# Patient Record
Sex: FEMALE | Race: WHITE | NOT HISPANIC OR LATINO | Employment: FULL TIME | URBAN - METROPOLITAN AREA
[De-identification: names, ages, dates, MRNs, and addresses within clinical notes are randomized per-mention and may not be internally consistent; named-entity substitution may affect disease eponyms.]

---

## 2017-01-24 ENCOUNTER — HOSPITAL ENCOUNTER (OUTPATIENT)
Facility: MEDICAL CENTER | Age: 52
End: 2017-01-24
Attending: INTERNAL MEDICINE
Payer: COMMERCIAL

## 2017-01-24 LAB
ALBUMIN SERPL BCP-MCNC: 4.3 G/DL (ref 3.2–4.9)
ALBUMIN/GLOB SERPL: 1.3 G/DL
ALP SERPL-CCNC: 612 U/L (ref 30–99)
ALT SERPL-CCNC: 173 U/L (ref 2–50)
ANION GAP SERPL CALC-SCNC: 9 MMOL/L (ref 0–11.9)
AST SERPL-CCNC: 118 U/L (ref 12–45)
BILIRUB SERPL-MCNC: 0.9 MG/DL (ref 0.1–1.5)
BUN SERPL-MCNC: 11 MG/DL (ref 8–22)
CALCIUM SERPL-MCNC: 9.6 MG/DL (ref 8.5–10.5)
CEA SERPL-MCNC: 2.5 NG/ML (ref 0–3)
CHLORIDE SERPL-SCNC: 105 MMOL/L (ref 96–112)
CO2 SERPL-SCNC: 24 MMOL/L (ref 20–33)
CREAT SERPL-MCNC: 0.52 MG/DL (ref 0.5–1.4)
GLOBULIN SER CALC-MCNC: 3.2 G/DL (ref 1.9–3.5)
GLUCOSE SERPL-MCNC: 91 MG/DL (ref 65–99)
POTASSIUM SERPL-SCNC: 4.1 MMOL/L (ref 3.6–5.5)
PROT SERPL-MCNC: 7.5 G/DL (ref 6–8.2)
SODIUM SERPL-SCNC: 138 MMOL/L (ref 135–145)

## 2017-01-24 PROCEDURE — 82378 CARCINOEMBRYONIC ANTIGEN: CPT

## 2017-01-24 PROCEDURE — 80053 COMPREHEN METABOLIC PANEL: CPT

## 2017-02-21 ENCOUNTER — HOSPITAL ENCOUNTER (OUTPATIENT)
Dept: RADIOLOGY | Facility: MEDICAL CENTER | Age: 52
End: 2017-02-21
Attending: INTERNAL MEDICINE
Payer: COMMERCIAL

## 2017-02-21 DIAGNOSIS — C18.9 MALIGNANT NEOPLASM OF COLON, UNSPECIFIED SITE: ICD-10-CM

## 2017-02-21 PROCEDURE — 700117 HCHG RX CONTRAST REV CODE 255: Performed by: INTERNAL MEDICINE

## 2017-02-21 PROCEDURE — 71260 CT THORAX DX C+: CPT

## 2017-02-21 RX ADMIN — IOHEXOL 100 ML: 350 INJECTION, SOLUTION INTRAVENOUS at 08:57

## 2017-04-11 ENCOUNTER — HOSPITAL ENCOUNTER (OUTPATIENT)
Dept: RADIOLOGY | Facility: MEDICAL CENTER | Age: 52
End: 2017-04-11
Attending: INTERNAL MEDICINE
Payer: COMMERCIAL

## 2017-04-11 DIAGNOSIS — C18.9 MALIGNANT NEOPLASM OF COLON, UNSPECIFIED SITE: ICD-10-CM

## 2017-04-11 NOTE — CONSULTS
"Interventional Oncology Consultation      Re: Vivian Bajwa     MRN: [id number]   : 1965    Vivian Bajwa was referred by Enrique Tate MD. She is a 51 y.o. female seen in clinic for evaluation and possible intervention of unresectable metastatic colon cancer to both hepatic lobes.     History of Present Illness:  Ms.Schlaffer Bajwa enjoyed good health until 2016. She has a history of Gilbert's disease and reports her total bilirubin has been elevated as long as she can remember. She describes an elevation of AST/ ALT on her annual labs in 2016 and had planned to repeat them later in the year, however in October she presented to her local emergency room with severe abdominal pain and was found to have a small bowel obstruction due to tumor as well as innumerable hepatic lesions. She underwent surgical intervention that included resection and biopsy of hepatic lesions, which proved metastatic adenocarcinoma. She first underwent FOLFOX in November. She was on Avastin as well but developed high fevers after each of two infusions, therefore she is no longer on this medication. She was switched to FOLFIRI in January and has infusions every two weeks, with her last infusion on  through . She denies any problems with chemotherapy and in fact has gained weight, from 96 to 106 pounds. She complains of dry hands and mild sores in her mouth with the chemotherapy but no other complaints. She had side effects from the steroids that increased her anxiety and caused insomnia but those have improved with dose reduction of her steroid infusion. She had abdominal pain and \"stomach swelling and hardness\" at the time of her diagnosis that has resolved. She works as an  and lives in [place]. She has a port in place. She has a history of anxiety and panic attacks and expresses a great deal of anxiety about her diagnosis and prognosis.     She is " seen today for review of imaging studies and discussion of possible transarterial therapy with Y90 radioembolization.     Past Medical History   Diagnosis Date   • Palpitations    • PMDD (premenstrual dysphoric disorder)    • Gilbert's disease    • GERD (gastroesophageal reflux disease)    • Vocal cord nodules    • Closed head injury    • Panic disorder      takes Xanax as needed   • Skull fracture (CMS-HCC)    • Femur fracture (CMS-HCC)    • Cancer (CMS-HCC)    • Metastatic colon cancer to liver (CMS-HCC)      Past Surgical History   Procedure Laterality Date   • Exploratory laparotomy  10/27/2016     Procedure: EXPLORATORY LAPAROTOMY;  Surgeon: Jose Borrego M.D.;  Location: SURGERY [facility];  Service:    • Colon resection  10/27/2016     Procedure: RIGHT COLON RESECTION;  Surgeon: Jose Borrego M.D.;  Location: SURGERY [facility];  Service:    • Liver biopsy  10/27/2016     Procedure: LIVER BIOPSY;  Surgeon: Jose Borrego M.D.;  Location: SURGERY [facility];  Service:    • Cath placement Right 11/1/2016     Procedure: CATH PLACEMENT - PORT ;  Surgeon: Jose Borrego M.D.;  Location: SURGERY SAME DAY [facility];  Service:      Social History     Social History   • Marital Status:      Spouse Name: N/A   • Number of Children: N/A   • Years of Education: N/A     Occupational History   • Not on file.     Social History Main Topics   • Smoking status: Former Smoker -- 0.25 packs/day     Types: Cigarettes     Quit date: 10/26/2016   • Smokeless tobacco: Never Used   • Alcohol Use: No   • Drug Use: No   • Sexual Activity: Not on file     Other Topics Concern   • Not on file     Social History Narrative     Family History   Problem Relation Age of Onset   • Heart Disease Mother    • Cancer Father      esoph       Review of Systems:  Pertinent items are noted in HPI.    Labs:   [facility] March 21, 2017:  WBC 4.8  HGB 12.8  HCT 38.6  Platelet count 198,000  ANC  2.2    [facility]:   Ref. Range 3/18/2017 11:00 4/1/2017 08:55   Sodium Latest Ref Range: 136-145 mmol/L 140 142   Potassium Latest Ref Range: 3.5-5.1 mmol/L 4.1 3.8   Chloride Latest Ref Range:  mmol/L 105 106   Co2 Latest Ref Range: 20-29 mmol/L 27 26   Anion Gap Latest Ref Range: 10-18 mmol/L 12 14   Glucose Latest Ref Range:  mg/dL 79 83   Bun Latest Ref Range: 6-18 mg/dL 12 7   Creatinine Latest Ref Range: 0.6-1.0 mg/dL 0.8 0.7   GFR If  Latest Ref Range: >60 mL/min/1.73 m 2 >60 >60   GFR If Non  Latest Ref Range: >60 mL/min/1.73 m 2 >60 >60   Calcium Latest Ref Range: 8.5-10.1 mg/dL 9.8 9.5   AST(SGOT) Latest Ref Range: 5-37 U/L 83 (H) 71 (H)   ALT(SGPT) Latest Ref Range: 12-78 U/L 186 (H) 133 (H)   Alkaline Phosphatase Latest Ref Range:  U/L 347 (H) 308 (H)   Total Bilirubin Latest Ref Range: 0.1-1.2 mg/dL 1.5 (H) 1.5 (H)   Albumin Latest Ref Range: 3.5-5.0 g/dL 3.8 3.8   Total Protein Latest Ref Range: 6.4-8.3 g/dL 7.4 7.2   A-G Ratio Unknown 1.1 1.1   Carcinoembryonic Antigen Latest Units: ng/mL 2.1 2.0        Ref. Range 12/28/2016 12:15   PT Latest Ref Range: 12.0-14.6 sec 14.8 (H)   INR Latest Ref Range: 0.87-1.13  1.12   APTT Latest Ref Range: 24.7-36.0 sec 43.8 (H)     Pathology:  [facility], October 27, 2016:  FINAL DIAGNOSIS:    A. Right colon resection:         Right colon demonstrates an invasive moderate to poorly          differentiated adenocarcinoma, including focal mucinous          differentiation.         The invasive adenocarcinoma is positive for synaptophysin          predominantly within the poorly differentiated component and          focally within the surrounding glands, consistent with focal          neuroendocrine differentiation.         The invasive adenocarcinoma invades into the subserosal adipose          tissue.         The proximal, distal and mesenteric resection margins are          negative for malignancy.         There is  angiolymphatic space invasion identified.         Eight of twenty-nine lymph nodes (8/29) positive for metastatic          adenocarcinoma.         There are approximately eight soft tissue tumor deposits.          Several of the tumor deposits are suggestive for former lymph          nodes completely replaced by metastatic adenocarcinoma.         Benign appendix negative for acute appendicitis or malignancy.         Benign terminal ileum negative for malignancy.         See synoptic report and comment below.  B. Liver biopsy:         Liver demonstrates a metastatic poorly differentiated carcinoma,          morphologically consistent with metastatic poorly          differentiated colon adenocarcinoma to the liver.         The metastatic adenocarcinoma extends to the cauterized margin          of the biopsy piece.  ADDENDUM:    A request for NESTOR/ZAHIRA Panel (Extended KRAS) molecular analysis was sent  out on 01/19/2017 for patient Vivian Bajwa from Dr. Colt Tate.  The test was requested to be performed on tissue from case  [id number], (signed out on 10/31/2016).  The case report, slides, and  blocks for the cited accession were retrieved from archives.  Dr. Phan  reviewed the original pathology report, examined candidate H&E slides,  and selected the block A5 appropriate to the specifications of the  ordered molecular analysis.  The block was forwarded to [facility] where the subject molecular test was performed.  Below  lists the results of the molecular test[s] ordered.    Block A5 was sent to [facility] for NESTOR/ZAHIRA Panel; a  report was generated.  Result:  DETECTED ABNORMALITIES BRAF.  Please  see separate [facility] report for further details.  ADDENDUM:    This case is being addended to report the results of IHC studies for  mismatch repair proteins to evaluate for Galvin syndrome. All four  mismatch repair proteins are expressed in the tumor cells.  These  findings make Gavlin syndrome unlikely. These results are also reported  in the IHC section below.    Radiology:   Review of imaging obtained at [facility]:  CT chest abdomen pelvis February 21, 2017:  1.  Decrease in size of multiple metastatic lesions within liver consistent with treatment response.  2.  Heterogeneous low-attenuation left lobe of the liver decreased consistent with treatment response.  3.  Thrombosed portal veins within the left lobe liver are partially recanalized.  4.  Proximal colectomy and ileocolic anastomosis. No evidence of bowel obstruction. No free fluid.  5.  Mild bilateral pelvocaliectasis similar to previous findings. Ureters are not well delineated.  6.  No pulmonary consolidation, mass, or pleural effusion.    CT abdomen pelvis December 28, 2016:  1.  Hepatomegaly with numerous hepatic metastases, as seen previously.  2.  RIGHT mid abdominal bowel anastomosis.  3.  No bowel obstruction or evidence for perforation.  4.  Increased colonic stool.  5.  Fibroid uterus.    Current Outpatient Prescriptions   Medication Sig Dispense Refill   • alprazolam (XANAX) 0.25 MG Tab Take 1 Tab by mouth 3 times a day as needed for Sleep. 90 Tab 3   • ondansetron (ZOFRAN ODT) 4 MG TABLET DISPERSIBLE Take 4 mg by mouth every 8 hours as needed for Nausea/Vomiting.     • oxycodone-acetaminophen (PERCOCET) 5-325 MG Tab Take 1 Tab by mouth every bedtime.     • loperamide (IMODIUM) 2 MG Cap Take 2 mg by mouth 4 times a day as needed for Diarrhea.     • acetaminophen (TYLENOL) 325 MG Tab Take 650 mg by mouth every four hours as needed (headache).       No current facility-administered medications for this encounter.       Allergies   Allergen Reactions   • Pcn [Penicillins]      Unknown allergy,rxn = as a kid  Tolerates cephalosporins     • Lexapro Unspecified     Heart raced   • Paxil [Paroxetine] Unspecified     Heart raced   • Zoloft Unspecified     Heart raced         Physical Exam:  General Appearance:  healthy, alert, cooperative, anxious  Lungs: negative findings: normal respiratory rate and rhythm  Extremities: positive findings: palmar dryness  Ambulates greater than 100 feet independently with steady gait.  ECOG Performance Status 0    Impression:   1. Unresectable metastatic colon cancer to both hepatic lobes.  2. Gilbert's disease.  3. Gastroesophageal reflux disease.  4. Anxiety.  5. Panic disorder.    Plan:   Yovanny Lamb MD has reviewed Ms.Schlaffer Bajwa's history and imaging studies, examined the patient, and discussed treatment options. Ms.Schlaffer Bajwa is a candidate for palliative transarterial radioembolization of unresectable liver cancer to both hepatic lobes. We discussed the method of the procedure at length including angiography and embolization as well as the expected clinical course with the possibility of post-embolization syndrome nausea and pain and hospitalization. We explained that this procedure is palliative and not curative. We discussed the possibility of tumor recurrence and development of future tumors. We additionally discussed the risks, including bleeding and infection, damage to the arteries, reaction to any medications given during the procedure, potential side effects of contrast administration including renal damage, non-target embolization, radiation-induced ulcers or liver disease, liver failure, and death. We discussed alternatives of the procedure including surveillance with no intervention and continuing with chemotherapy alone. The patient verbalizes understanding of risks and alternatives and elects to proceed. All questions were answered. Written pre- and post- procedure care instructions were provided.     The plan is as follows, pending insurance authorization:  · Start proton pump inhibitor.   · Continue FOLFIRI, currently scheduled for 4/18, 5/2, and 5/16.  · Mapping April 24.   · Y90 SIRT Right lobe May 9.  · Follow up in clinic 10-14 days after  procedure.  · Plan Y90 SIRT left lobe end of June.  · Triple phase CT liver at 3 months.    FUNMILAYO Choi with Yovanny Lamb MD  Interventional Radiology   [address] ([id number])  [place] [address]  [phone]

## 2017-04-19 DIAGNOSIS — Z01.812 PRE-PROCEDURAL LABORATORY EXAMINATIONS: ICD-10-CM

## 2017-04-19 LAB
ALBUMIN SERPL BCP-MCNC: 4.2 G/DL (ref 3.2–4.9)
ALBUMIN SERPL BCP-MCNC: 4.2 G/DL (ref 3.2–4.9)
ALBUMIN/GLOB SERPL: 1.6 G/DL
ALP SERPL-CCNC: 234 U/L (ref 30–99)
ALP SERPL-CCNC: 234 U/L (ref 30–99)
ALT SERPL-CCNC: 117 U/L (ref 2–50)
ALT SERPL-CCNC: 118 U/L (ref 2–50)
ANION GAP SERPL CALC-SCNC: 7 MMOL/L (ref 0–11.9)
AST SERPL-CCNC: 89 U/L (ref 12–45)
AST SERPL-CCNC: 92 U/L (ref 12–45)
BASOPHILS # BLD AUTO: 0.7 % (ref 0–1.8)
BASOPHILS # BLD: 0.04 K/UL (ref 0–0.12)
BILIRUB CONJ SERPL-MCNC: 0.4 MG/DL (ref 0.1–0.5)
BILIRUB INDIRECT SERPL-MCNC: 2.8 MG/DL (ref 0–1)
BILIRUB SERPL-MCNC: 3.2 MG/DL (ref 0.1–1.5)
BILIRUB SERPL-MCNC: 3.2 MG/DL (ref 0.1–1.5)
BUN SERPL-MCNC: 11 MG/DL (ref 8–22)
CALCIUM SERPL-MCNC: 9.9 MG/DL (ref 8.5–10.5)
CHLORIDE SERPL-SCNC: 105 MMOL/L (ref 96–112)
CO2 SERPL-SCNC: 28 MMOL/L (ref 20–33)
CREAT SERPL-MCNC: 0.84 MG/DL (ref 0.5–1.4)
EOSINOPHIL # BLD AUTO: 0.07 K/UL (ref 0–0.51)
EOSINOPHIL NFR BLD: 1.2 % (ref 0–6.9)
ERYTHROCYTE [DISTWIDTH] IN BLOOD BY AUTOMATED COUNT: 58.1 FL (ref 35.9–50)
GFR SERPL CREATININE-BSD FRML MDRD: >60 ML/MIN/1.73 M 2
GLOBULIN SER CALC-MCNC: 2.7 G/DL (ref 1.9–3.5)
GLUCOSE SERPL-MCNC: 83 MG/DL (ref 65–99)
HCT VFR BLD AUTO: 38.4 % (ref 37–47)
HGB BLD-MCNC: 12.6 G/DL (ref 12–16)
IMM GRANULOCYTES # BLD AUTO: 0.01 K/UL (ref 0–0.11)
IMM GRANULOCYTES NFR BLD AUTO: 0.2 % (ref 0–0.9)
INR PPP: 0.94 (ref 0.87–1.13)
LYMPHOCYTES # BLD AUTO: 1.74 K/UL (ref 1–4.8)
LYMPHOCYTES NFR BLD: 30.9 % (ref 22–41)
MCH RBC QN AUTO: 34.1 PG (ref 27–33)
MCHC RBC AUTO-ENTMCNC: 32.8 G/DL (ref 33.6–35)
MCV RBC AUTO: 103.8 FL (ref 81.4–97.8)
MONOCYTES # BLD AUTO: 0.49 K/UL (ref 0–0.85)
MONOCYTES NFR BLD AUTO: 8.7 % (ref 0–13.4)
NEUTROPHILS # BLD AUTO: 3.28 K/UL (ref 2–7.15)
NEUTROPHILS NFR BLD: 58.3 % (ref 44–72)
NRBC # BLD AUTO: 0 K/UL
NRBC BLD AUTO-RTO: 0 /100 WBC
PLATELET # BLD AUTO: 202 K/UL (ref 164–446)
PMV BLD AUTO: 10.6 FL (ref 9–12.9)
POTASSIUM SERPL-SCNC: 3.6 MMOL/L (ref 3.6–5.5)
PROT SERPL-MCNC: 6.9 G/DL (ref 6–8.2)
PROT SERPL-MCNC: 6.9 G/DL (ref 6–8.2)
PROTHROMBIN TIME: 12.9 SEC (ref 12–14.6)
RBC # BLD AUTO: 3.7 M/UL (ref 4.2–5.4)
SODIUM SERPL-SCNC: 140 MMOL/L (ref 135–145)
WBC # BLD AUTO: 5.6 K/UL (ref 4.8–10.8)

## 2017-04-19 PROCEDURE — 36415 COLL VENOUS BLD VENIPUNCTURE: CPT

## 2017-04-19 PROCEDURE — 80076 HEPATIC FUNCTION PANEL: CPT

## 2017-04-19 PROCEDURE — 80053 COMPREHEN METABOLIC PANEL: CPT

## 2017-04-19 PROCEDURE — 85610 PROTHROMBIN TIME: CPT

## 2017-04-19 PROCEDURE — 85025 COMPLETE CBC W/AUTO DIFF WBC: CPT

## 2017-04-19 RX ORDER — IBUPROFEN 200 MG
200 TABLET ORAL EVERY 6 HOURS PRN
COMMUNITY
End: 2019-01-31

## 2017-04-20 ENCOUNTER — TELEPHONE (OUTPATIENT)
Dept: RADIOLOGY | Facility: MEDICAL CENTER | Age: 52
End: 2017-04-20

## 2017-04-20 NOTE — TELEPHONE ENCOUNTER
Pt contacted regarding chemistry results from 4/19. T bili now 3.2. D/w Dr. Lamb, will cx mapping scheduled 4/24. Pt is on FOLFIRI pump and was infusing during her blood draw yesterday. Pre chemo t bili was 1.1 on 4/15. Has appt with Dr. Tate today.   Labs efaxed to Dr. Tate's office for review. He has Dr. Lamb's contact info to discuss if needed to discuss the plan for Y90 SIRT and chemo. Will recheck t bili and direct/indirect after pt off chemo and keep her appt 5/09 for possible mapping if her labs return to baseline.

## 2017-05-09 ENCOUNTER — APPOINTMENT (OUTPATIENT)
Dept: RADIOLOGY | Facility: MEDICAL CENTER | Age: 52
End: 2017-05-09
Attending: RADIOLOGY
Payer: COMMERCIAL

## 2017-05-09 ENCOUNTER — HOSPITAL ENCOUNTER (OUTPATIENT)
Facility: MEDICAL CENTER | Age: 52
End: 2017-05-09
Attending: RADIOLOGY | Admitting: RADIOLOGY
Payer: COMMERCIAL

## 2017-05-09 VITALS
HEART RATE: 62 BPM | BODY MASS INDEX: 20.04 KG/M2 | OXYGEN SATURATION: 99 % | SYSTOLIC BLOOD PRESSURE: 128 MMHG | RESPIRATION RATE: 17 BRPM | HEIGHT: 63 IN | WEIGHT: 113.1 LBS | DIASTOLIC BLOOD PRESSURE: 71 MMHG | TEMPERATURE: 97.2 F

## 2017-05-09 DIAGNOSIS — C78.7 METASTATIC COLON CANCER TO LIVER (HCC): ICD-10-CM

## 2017-05-09 DIAGNOSIS — C18.9 METASTATIC COLON CANCER TO LIVER (HCC): ICD-10-CM

## 2017-05-09 PROCEDURE — 700111 HCHG RX REV CODE 636 W/ 250 OVERRIDE (IP): Performed by: RADIOLOGY

## 2017-05-09 PROCEDURE — 76937 US GUIDE VASCULAR ACCESS: CPT

## 2017-05-09 PROCEDURE — 36246 INS CATH ABD/L-EXT ART 2ND: CPT

## 2017-05-09 PROCEDURE — 37243 VASC EMBOLIZE/OCCLUDE ORGAN: CPT

## 2017-05-09 PROCEDURE — 700105 HCHG RX REV CODE 258: Performed by: RADIOLOGY

## 2017-05-09 PROCEDURE — 99153 MOD SED SAME PHYS/QHP EA: CPT

## 2017-05-09 PROCEDURE — A9540 TC99M MAA: HCPCS

## 2017-05-09 PROCEDURE — 700111 HCHG RX REV CODE 636 W/ 250 OVERRIDE (IP)

## 2017-05-09 RX ORDER — MIDAZOLAM HYDROCHLORIDE 1 MG/ML
INJECTION INTRAMUSCULAR; INTRAVENOUS
Status: COMPLETED
Start: 2017-05-09 | End: 2017-05-09

## 2017-05-09 RX ORDER — OXYCODONE HYDROCHLORIDE 5 MG/1
10 TABLET ORAL
Status: DISCONTINUED | OUTPATIENT
Start: 2017-05-09 | End: 2017-05-09 | Stop reason: HOSPADM

## 2017-05-09 RX ORDER — OXYCODONE HYDROCHLORIDE 5 MG/1
5 TABLET ORAL
Status: DISCONTINUED | OUTPATIENT
Start: 2017-05-09 | End: 2017-05-09 | Stop reason: HOSPADM

## 2017-05-09 RX ORDER — VERAPAMIL HYDROCHLORIDE 2.5 MG/ML
INJECTION, SOLUTION INTRAVENOUS
Status: COMPLETED
Start: 2017-05-09 | End: 2017-05-09

## 2017-05-09 RX ORDER — ONDANSETRON 2 MG/ML
4 INJECTION INTRAMUSCULAR; INTRAVENOUS EVERY 8 HOURS PRN
Status: DISCONTINUED | OUTPATIENT
Start: 2017-05-09 | End: 2017-05-09 | Stop reason: HOSPADM

## 2017-05-09 RX ORDER — MIDAZOLAM HYDROCHLORIDE 1 MG/ML
.5-2 INJECTION INTRAMUSCULAR; INTRAVENOUS PRN
Status: ACTIVE | OUTPATIENT
Start: 2017-05-09 | End: 2017-05-09

## 2017-05-09 RX ORDER — SODIUM CHLORIDE 9 MG/ML
INJECTION, SOLUTION INTRAVENOUS
Status: DISCONTINUED | OUTPATIENT
Start: 2017-05-09 | End: 2017-05-09 | Stop reason: HOSPADM

## 2017-05-09 RX ORDER — ONDANSETRON 2 MG/ML
INJECTION INTRAMUSCULAR; INTRAVENOUS
Status: COMPLETED
Start: 2017-05-09 | End: 2017-05-09

## 2017-05-09 RX ORDER — ONDANSETRON 2 MG/ML
4 INJECTION INTRAMUSCULAR; INTRAVENOUS PRN
Status: DISCONTINUED | OUTPATIENT
Start: 2017-05-09 | End: 2017-05-09

## 2017-05-09 RX ORDER — DEXAMETHASONE SODIUM PHOSPHATE 4 MG/ML
8 INJECTION, SOLUTION INTRA-ARTICULAR; INTRALESIONAL; INTRAMUSCULAR; INTRAVENOUS; SOFT TISSUE
Status: COMPLETED | OUTPATIENT
Start: 2017-05-09 | End: 2017-05-09

## 2017-05-09 RX ORDER — MORPHINE SULFATE 4 MG/ML
4 INJECTION, SOLUTION INTRAMUSCULAR; INTRAVENOUS
Status: DISCONTINUED | OUTPATIENT
Start: 2017-05-09 | End: 2017-05-09 | Stop reason: HOSPADM

## 2017-05-09 RX ORDER — SODIUM CHLORIDE 9 MG/ML
500 INJECTION, SOLUTION INTRAVENOUS
Status: ACTIVE | OUTPATIENT
Start: 2017-05-09 | End: 2017-05-09

## 2017-05-09 RX ORDER — HEPARIN SODIUM,PORCINE 1000/ML
VIAL (ML) INJECTION
Status: COMPLETED
Start: 2017-05-09 | End: 2017-05-09

## 2017-05-09 RX ADMIN — NITROGLYCERIN 100 MCG: 20 INJECTION INTRAVENOUS at 10:20

## 2017-05-09 RX ADMIN — MIDAZOLAM HYDROCHLORIDE 1 MG: 1 INJECTION INTRAMUSCULAR; INTRAVENOUS at 09:56

## 2017-05-09 RX ADMIN — MIDAZOLAM 1 MG: 1 INJECTION INTRAMUSCULAR; INTRAVENOUS at 10:55

## 2017-05-09 RX ADMIN — MIDAZOLAM HYDROCHLORIDE 1 MG: 1 INJECTION INTRAMUSCULAR; INTRAVENOUS at 10:47

## 2017-05-09 RX ADMIN — MIDAZOLAM HYDROCHLORIDE 1 MG: 1 INJECTION INTRAMUSCULAR; INTRAVENOUS at 11:02

## 2017-05-09 RX ADMIN — MIDAZOLAM HYDROCHLORIDE 1 MG: 1 INJECTION INTRAMUSCULAR; INTRAVENOUS at 10:45

## 2017-05-09 RX ADMIN — FENTANYL CITRATE 50 MCG: 50 INJECTION, SOLUTION INTRAMUSCULAR; INTRAVENOUS at 09:57

## 2017-05-09 RX ADMIN — VERAPAMIL HYDROCHLORIDE 2.5 MG: 2.5 INJECTION, SOLUTION INTRAVENOUS at 10:20

## 2017-05-09 RX ADMIN — MIDAZOLAM 1 MG: 1 INJECTION INTRAMUSCULAR; INTRAVENOUS at 09:56

## 2017-05-09 RX ADMIN — MIDAZOLAM 1 MG: 1 INJECTION INTRAMUSCULAR; INTRAVENOUS at 10:45

## 2017-05-09 RX ADMIN — DEXAMETHASONE SODIUM PHOSPHATE 8 MG: 4 INJECTION, SOLUTION INTRAMUSCULAR; INTRAVENOUS at 09:00

## 2017-05-09 RX ADMIN — MIDAZOLAM HYDROCHLORIDE 1 MG: 1 INJECTION INTRAMUSCULAR; INTRAVENOUS at 10:01

## 2017-05-09 RX ADMIN — MIDAZOLAM HYDROCHLORIDE 1 MG: 1 INJECTION INTRAMUSCULAR; INTRAVENOUS at 10:08

## 2017-05-09 RX ADMIN — ONDANSETRON HYDROCHLORIDE 16 MG: 2 SOLUTION INTRAMUSCULAR; INTRAVENOUS at 09:00

## 2017-05-09 RX ADMIN — HEPARIN SODIUM 3000 UNITS: 1000 INJECTION, SOLUTION INTRAVENOUS; SUBCUTANEOUS at 10:20

## 2017-05-09 RX ADMIN — SODIUM CHLORIDE: 9 INJECTION, SOLUTION INTRAVENOUS at 09:00

## 2017-05-09 RX ADMIN — MIDAZOLAM HYDROCHLORIDE 1 MG: 1 INJECTION INTRAMUSCULAR; INTRAVENOUS at 10:55

## 2017-05-09 RX ADMIN — MIDAZOLAM HYDROCHLORIDE 1 MG: 1 INJECTION INTRAMUSCULAR; INTRAVENOUS at 10:24

## 2017-05-09 ASSESSMENT — PAIN SCALES - GENERAL
PAINLEVEL_OUTOF10: 2
PAINLEVEL_OUTOF10: 0
PAINLEVEL_OUTOF10: 2

## 2017-05-09 NOTE — PROGRESS NOTES
"Y90 mapping performed by Dr Lamb via left radial access; intra-arterial \"cocktail\" administered (heparin 3,000 units, nitroglycerin 100 mcg, verapamil 2.5 mg) for sheath insertion. Patient tolerated procedure well and TR band applied to left radial cath insertion site with 13 ml inflation.  updated and accompanied patient to PPU.    [employer] VortX Meme 18 pushable coil 3 mm x 3.3 mm ref [id number] lot #[id number]  "

## 2017-05-09 NOTE — PROGRESS NOTES
Imaging in nuclear medicine complete and patient transported to PPU in good condition. Left radial access site CDI, cap refill brisk.

## 2017-05-09 NOTE — IP AVS SNAPSHOT
5/9/2017    [facility] [address] [id number]  [place] [address]    Dear Vivian:    [facility] wants to ensure your discharge home is safe and you or your loved ones have had all of your questions answered regarding your care after you leave the hospital.    Below is a list of resources and contact information should you have any questions regarding your hospital stay, follow-up instructions, or active medical symptoms.    Questions or Concerns Regarding… Contact   Medical Questions Related to Your Discharge  (7 days a week, 8am-5pm) Contact a Nurse Care Coordinator   [phone]   Medical Questions Not Related to Your Discharge  (24 hours a day / 7 days a week)  Contact the Nurse Health Line   [phone]    Medications or Discharge Instructions Refer to your discharge packet   or contact your [facility] Primary Care Provider   [phone]   Follow-up Appointment(s) Schedule your appointment via [employer]   or contact Scheduling [phone]   Billing Review your statement via [employer]  or contact Billing [phone]   Medical Records Review your records via [employer]   or contact Medical Records [phone]     You may receive a telephone call within two days of discharge. This call is to make certain you understand your discharge instructions and have the opportunity to have any questions answered. You can also easily access your medical information, test results and upcoming appointments via the [employer] free online health management tool. You can learn more and sign up at [employer]. For assistance setting up your [employer] account, please call [phone].    Once again, we want to ensure your discharge home is safe and that you have a clear understanding of any next steps in your care. If you have any questions or concerns, please do not hesitate to contact us, we are here for you. Thank you for choosing [facility] for your healthcare needs.    Sincerely,    Your [facility] Healthcare Team

## 2017-05-09 NOTE — OR NURSING
1156 patient arrived from IR s/p Y90 mapping left radial access hemoband clean, vss, denies pain, s.o.b, n/v, n/t  1210 3 cc removed from hemoband site clean  1225 3 cc removed from lt radial site clean  1240 3 cc removed from lt radial site clean  1255 4 cc removed from lt radial gauze tegarderm applied, patient requesting pressure dressing to be applied on top, pressure dressing applied no bleeding  1335 discharge instructions given to patient, patient verbalize understanding of the orders, prior to dc vss, port de accessed no bleeding, left radial clean, pt left via walking with  and all her personal belongings.

## 2017-05-09 NOTE — IP AVS SNAPSHOT
" Home Care Instructions                                                                                                                Name:Vivian Bajwa  Medical Record Number:[id number]  CSN: [id number]    YOB: 1965   Age: 51 y.o.  Sex: female  HT:1.6 m (5' 2.99\") WT: 51.3 kg (113 lb 1.5 oz)          Admit Date: 5/9/2017     Discharge Date:   Today's Date: 5/9/2017  Attending Doctor:  Yovanny Lamb M.D.                  Allergies:  Pcn; Lexapro; Paxil; and Zoloft                Discharge Instructions         ACTIVITY: Rest and take it easy for the first 24 hours.  A responsible adult is recommended to remain with you during that time.  It is normal to feel sleepy.  We encourage you to not do anything that requires balance, judgment or coordination.    MILD FLU-LIKE SYMPTOMS ARE NORMAL. YOU MAY EXPERIENCE GENERALIZED MUSCLE ACHES, THROAT IRRITATION, HEADACHE AND/OR SOME NAUSEA.    FOR 24 HOURS DO NOT:  Drive, operate machinery or run household appliances.  Drink beer or alcoholic beverages.   Make important decisions or sign legal documents.    SPECIAL INSTRUCTIONS: follow up with Dr lamb call with any questions [phone]   If you experience chest pain s.o.b call 911 return to Er     DIET: To avoid nausea, slowly advance diet as tolerated, avoiding spicy or greasy foods for the first day.  Add more substantial food to your diet according to your physician's instructions.  Babies can be fed formula or breast milk as soon as they are hungry.  INCREASE FLUIDS AND FIBER TO AVOID CONSTIPATION.    SURGICAL DRESSING/BATHING: keep dressing clean and dry for 24 hours, you may remove dressing after 24 hours    FOLLOW-UP APPOINTMENT:  A follow-up appointment should be arranged with your doctor in [phone]; call to schedule.    You should CALL YOUR PHYSICIAN if you develop:  Fever greater than 101 degrees F.  Pain not relieved by medication, or persistent nausea or vomiting.  Excessive bleeding (blood " soaking through dressing) or unexpected drainage from the wound.  Extreme redness or swelling around the incision site, drainage of pus or foul smelling drainage.  Inability to urinate or empty your bladder within 8 hours.  Problems with breathing or chest pain.    You should call 911 if you develop problems with breathing or chest pain.  If you are unable to contact your doctor or surgical center, you should go to the nearest emergency room or urgent care center.  Physician's telephone #: [phone]    If any questions arise, call your doctor.  If your doctor is not available, please feel free to call the Surgical Center at [phone].  The Center is open Monday through Friday from 7AM to 7PM.  You can also call the [facility] HOTLINE open 24 hours/day, 7 days/week and speak to a nurse at [phone], or toll free at [phone].    A registered nurse may call you a few days after your surgery to see how you are doing after your procedure.    MEDICATIONS: Resume taking daily medication.  Take prescribed pain medication with food.  If no medication is prescribed, you may take non-aspirin pain medication if needed.  PAIN MEDICATION CAN BE VERY CONSTIPATING.  Take a stool softener or laxative such as senokot, pericolace, or milk of magnesia if needed    If your physician has prescribed pain medication that includes Acetaminophen (Tylenol), do not take additional Acetaminophen (Tylenol) while taking the prescribed medication.    Depression / Suicide Risk    As you are discharged from this [facility] facility, it is important to learn how to keep safe from harming yourself.    Recognize the warning signs:  · Abrupt changes in personality, positive or negative- including increase in energy   · Giving away possessions  · Change in eating patterns- significant weight changes-  positive or negative  · Change in sleeping patterns- unable to sleep or sleeping all the time   · Unwillingness or inability to  communicate  · Depression  · Unusual sadness, discouragement and loneliness  · Talk of wanting to die  · Neglect of personal appearance   · Rebelliousness- reckless behavior  · Withdrawal from people/activities they love  · Confusion- inability to concentrate     If you or a loved one observes any of these behaviors or has concerns about self-harm, here's what you can do:  · Talk about it- your feelings and reasons for harming yourself  · Remove any means that you might use to hurt yourself (examples: pills, rope, extension cords, firearm)  · Get professional help from the community (Mental Health, Substance Abuse, psychological counseling)  · Do not be alone:Call your Safe Contact- someone whom you trust who will be there for you.  · Call your local CRISIS HOTLINE 529-3436 or 642-244-2771  · Call your local Children's Mobile Crisis Response Team [place] (533) 884-2638 or www.Ar[employer]CAD  · Call the toll free National Suicide Prevention Hotlines   · National Suicide Prevention Lifeline 705-368-WFMZ (7799)  [place] [facility] Line Network 800-SUICIDE (359-4830)  Radial Catherization Discharge Instructions      · Do not subject hand/arm to any forceful movements for 24 hours    i.e. supporting weight when rising from the chair or bed.   · Do not drive a car for 24 hours  · You may remove the dressing tomorrow  · You may shower on the day following your procedure.  Do not take a tub bath or submerge the puncture site in water for 3 days following the procedure.  · No Lifting more than 3-5 pounds with affected wrist for 5 days  · Follow up with Dr. Lamb2-4 weeks.  · Increase fluids for 2 days post procedure.  · Continue all previous medications unless otherwise instructed.    If bleeding should occur following discharge:  · Sit down and apply firm pressure to site with your fingers for 10 minutes  · If the bleeding stops, continue to sit quietly, keeping your wrist straight for 2 hours.  Notify physician as soon  as possible ( [phone])  · If bleeding does not stop after 10 minutes, or if there is a large amount of bleeding or spurting, call 911 immediately.  Do not drive yourself to the hospital.       Medication List      CHANGE how you take these medications        Instructions    Morning Afternoon Evening Bedtime    doxycycline 100 MG Tabs   What changed:  additional instructions   Commonly known as:  VIBRAMYCIN        Take 1 Tab by mouth 2 times a day.   Dose:  100 mg                          CONTINUE taking these medications        Instructions    Morning Afternoon Evening Bedtime    alprazolam 0.25 MG Tabs   Commonly known as:  XANAX        Take 1 Tab by mouth 3 times a day as needed for Sleep.   Dose:  0.25 mg                        ibuprofen 200 MG Tabs   Commonly known as:  MOTRIN        Take 200 mg by mouth every 6 hours as needed.   Dose:  200 mg                        NON SPECIFIED        by Intravenous route every 14 days. Folfiri chemotherapy                        ondansetron 4 MG Tbdp   Commonly known as:  ZOFRAN ODT        Take 4 mg by mouth every 8 hours as needed for Nausea/Vomiting.   Dose:  4 mg                        oxycodone-acetaminophen 5-325 MG Tabs   Commonly known as:  PERCOCET        Take 1 Tab by mouth every bedtime.   Dose:  1 Tab                                Medication Information     Above and/or attached are the medications your physician expects you to take upon discharge. Review all of your home medications and newly ordered medications with your doctor and/or pharmacist. Follow medication instructions as directed by your doctor and/or pharmacist. Please keep your medication list with you and share with your physician. Update the information when medications are discontinued, doses are changed, or new medications (including over-the-counter products) are added; and carry medication information at all times in the event of emergency situations.        Resources     Quit Smoking /  Tobacco Use:    I understand the use of any tobacco products increases my chance of suffering from future heart disease or stroke and could cause other illnesses which may shorten my life. Quitting the use of tobacco products is the single most important thing I can do to improve my health. For further information on smoking / tobacco cessation call a Toll Free Quit Line at 1-433.402.7793 (*[employer] [place]) or 1-247.917.7876 (American Lung Association) or you can access the web based program at www.lungusa.org.    [place] Tobacco Users Help Line:  (521) 409-8963       Toll Free: 1-133.108.6526  Quit Tobacco Program [facility] (457)535-2824    Crisis Hotline:    [place] Crisis Hotline:  8-595-RLGPSOD or 1-685.153.3052    [place] Crisis Hotline:    1-355.591.4839 or 173-575-4721    Discharge Survey:   Thank you for choosing [facility]. We hope we did everything we could to make your hospital stay a pleasant one. You may be receiving a survey and we would appreciate your time and participation in answering the questions. Your input is very valuable to us in our efforts to improve our service to our patients and their families.            Signatures     My signature on this form indicates that:    1. I acknowledge receipt and understanding of these Home Care Instruction.  2. My questions regarding this information have been answered to my satisfaction.  3. I have formulated a plan with my discharge nurse to obtain my prescribed medications for home.    __________________________________      __________________________________                   Patient Signature                                 Guardian/Responsible Adult Signature      __________________________________                 __________       ________                       Nurse Signature                                               Date                 Time

## 2017-05-09 NOTE — DISCHARGE INSTRUCTIONS
ACTIVITY: Rest and take it easy for the first 24 hours.  A responsible adult is recommended to remain with you during that time.  It is normal to feel sleepy.  We encourage you to not do anything that requires balance, judgment or coordination.    MILD FLU-LIKE SYMPTOMS ARE NORMAL. YOU MAY EXPERIENCE GENERALIZED MUSCLE ACHES, THROAT IRRITATION, HEADACHE AND/OR SOME NAUSEA.    FOR 24 HOURS DO NOT:  Drive, operate machinery or run household appliances.  Drink beer or alcoholic beverages.   Make important decisions or sign legal documents.    SPECIAL INSTRUCTIONS: follow up with Dr reyes call with any questions [phone]   If you experience chest pain s.o.b call 911 return to Er     DIET: To avoid nausea, slowly advance diet as tolerated, avoiding spicy or greasy foods for the first day.  Add more substantial food to your diet according to your physician's instructions.  Babies can be fed formula or breast milk as soon as they are hungry.  INCREASE FLUIDS AND FIBER TO AVOID CONSTIPATION.    SURGICAL DRESSING/BATHING: keep dressing clean and dry for 24 hours, you may remove dressing after 24 hours    FOLLOW-UP APPOINTMENT:  A follow-up appointment should be arranged with your doctor in [phone]; call to schedule.    You should CALL YOUR PHYSICIAN if you develop:  Fever greater than 101 degrees F.  Pain not relieved by medication, or persistent nausea or vomiting.  Excessive bleeding (blood soaking through dressing) or unexpected drainage from the wound.  Extreme redness or swelling around the incision site, drainage of pus or foul smelling drainage.  Inability to urinate or empty your bladder within 8 hours.  Problems with breathing or chest pain.    You should call 911 if you develop problems with breathing or chest pain.  If you are unable to contact your doctor or surgical center, you should go to the nearest emergency room or urgent care center.  Physician's telephone #: [phone]    If any questions arise, call your  doctor.  If your doctor is not available, please feel free to call the Surgical Center at [phone].  The Center is open Monday through Friday from 7AM to 7PM.  You can also call the HEALTH HOTLINE open 24 hours/day, 7 days/week and speak to a nurse at [phone], or toll free at [phone].    A registered nurse may call you a few days after your surgery to see how you are doing after your procedure.    MEDICATIONS: Resume taking daily medication.  Take prescribed pain medication with food.  If no medication is prescribed, you may take non-aspirin pain medication if needed.  PAIN MEDICATION CAN BE VERY CONSTIPATING.  Take a stool softener or laxative such as senokot, pericolace, or milk of magnesia if needed    If your physician has prescribed pain medication that includes Acetaminophen (Tylenol), do not take additional Acetaminophen (Tylenol) while taking the prescribed medication.    Depression / Suicide Risk    As you are discharged from this [facility] facility, it is important to learn how to keep safe from harming yourself.    Recognize the warning signs:  · Abrupt changes in personality, positive or negative- including increase in energy   · Giving away possessions  · Change in eating patterns- significant weight changes-  positive or negative  · Change in sleeping patterns- unable to sleep or sleeping all the time   · Unwillingness or inability to communicate  · Depression  · Unusual sadness, discouragement and loneliness  · Talk of wanting to die  · Neglect of personal appearance   · Rebelliousness- reckless behavior  · Withdrawal from people/activities they love  · Confusion- inability to concentrate     If you or a loved one observes any of these behaviors or has concerns about self-harm, here's what you can do:  · Talk about it- your feelings and reasons for harming yourself  · Remove any means that you might use to hurt yourself (examples: pills, rope, extension cords, firearm)  · Get  professional help from the community (Mental Health, Substance Abuse, psychological counseling)  · Do not be alone:Call your Safe Contact- someone whom you trust who will be there for you.  · Call your local CRISIS HOTLINE 949-0594 or 552-790-1615  · Call your local Children's Mobile Crisis Response Team [place] (705) 986-8032 or www.[employer]  · Call the toll free National Suicide Prevention Hotlines   · National Suicide Prevention Lifeline 432-754-SROM (4254)  [facility] Line Network 800-SUICIDE (806-7045)  Radial Catherization Discharge Instructions      · Do not subject hand/arm to any forceful movements for 24 hours    i.e. supporting weight when rising from the chair or bed.   · Do not drive a car for 24 hours  · You may remove the dressing tomorrow  · You may shower on the day following your procedure.  Do not take a tub bath or submerge the puncture site in water for 3 days following the procedure.  · No Lifting more than 3-5 pounds with affected wrist for 5 days  · Follow up with Dr. Lamb2-4 weeks.  · Increase fluids for 2 days post procedure.  · Continue all previous medications unless otherwise instructed.    If bleeding should occur following discharge:  · Sit down and apply firm pressure to site with your fingers for 10 minutes  · If the bleeding stops, continue to sit quietly, keeping your wrist straight for 2 hours.  Notify physician as soon as possible ( [phone])  · If bleeding does not stop after 10 minutes, or if there is a large amount of bleeding or spurting, call 911 immediately.  Do not drive yourself to the hospital.

## 2017-05-10 ENCOUNTER — HOSPITAL ENCOUNTER (EMERGENCY)
Facility: MEDICAL CENTER | Age: 52
End: 2017-05-10
Attending: EMERGENCY MEDICINE
Payer: COMMERCIAL

## 2017-05-10 VITALS
RESPIRATION RATE: 16 BRPM | TEMPERATURE: 99.3 F | SYSTOLIC BLOOD PRESSURE: 124 MMHG | HEART RATE: 71 BPM | OXYGEN SATURATION: 97 % | DIASTOLIC BLOOD PRESSURE: 85 MMHG | HEIGHT: 63 IN

## 2017-05-10 DIAGNOSIS — R58 ECCHYMOSIS: ICD-10-CM

## 2017-05-10 LAB
ALBUMIN SERPL BCP-MCNC: 4.1 G/DL (ref 3.2–4.9)
ALBUMIN/GLOB SERPL: 1.6 G/DL
ALP SERPL-CCNC: 192 U/L (ref 30–99)
ALT SERPL-CCNC: 70 U/L (ref 2–50)
ANION GAP SERPL CALC-SCNC: 7 MMOL/L (ref 0–11.9)
APTT PPP: 39.6 SEC (ref 24.7–36)
AST SERPL-CCNC: 49 U/L (ref 12–45)
BASOPHILS # BLD AUTO: 0.3 % (ref 0–1.8)
BASOPHILS # BLD: 0.02 K/UL (ref 0–0.12)
BILIRUB SERPL-MCNC: 1.3 MG/DL (ref 0.1–1.5)
BUN SERPL-MCNC: 7 MG/DL (ref 8–22)
CALCIUM SERPL-MCNC: 9.5 MG/DL (ref 8.5–10.5)
CHLORIDE SERPL-SCNC: 111 MMOL/L (ref 96–112)
CO2 SERPL-SCNC: 26 MMOL/L (ref 20–33)
CREAT SERPL-MCNC: 0.63 MG/DL (ref 0.5–1.4)
EOSINOPHIL # BLD AUTO: 0.06 K/UL (ref 0–0.51)
EOSINOPHIL NFR BLD: 0.8 % (ref 0–6.9)
ERYTHROCYTE [DISTWIDTH] IN BLOOD BY AUTOMATED COUNT: 51.5 FL (ref 35.9–50)
GFR SERPL CREATININE-BSD FRML MDRD: >60 ML/MIN/1.73 M 2
GLOBULIN SER CALC-MCNC: 2.5 G/DL (ref 1.9–3.5)
GLUCOSE SERPL-MCNC: 93 MG/DL (ref 65–99)
HCT VFR BLD AUTO: 36.9 % (ref 37–47)
HGB BLD-MCNC: 12.5 G/DL (ref 12–16)
IMM GRANULOCYTES # BLD AUTO: 0.02 K/UL (ref 0–0.11)
IMM GRANULOCYTES NFR BLD AUTO: 0.3 % (ref 0–0.9)
INR PPP: 0.94 (ref 0.87–1.13)
LYMPHOCYTES # BLD AUTO: 2.51 K/UL (ref 1–4.8)
LYMPHOCYTES NFR BLD: 34.9 % (ref 22–41)
MCH RBC QN AUTO: 34.3 PG (ref 27–33)
MCHC RBC AUTO-ENTMCNC: 33.9 G/DL (ref 33.6–35)
MCV RBC AUTO: 101.4 FL (ref 81.4–97.8)
MONOCYTES # BLD AUTO: 0.48 K/UL (ref 0–0.85)
MONOCYTES NFR BLD AUTO: 6.7 % (ref 0–13.4)
NEUTROPHILS # BLD AUTO: 4.11 K/UL (ref 2–7.15)
NEUTROPHILS NFR BLD: 57 % (ref 44–72)
NRBC # BLD AUTO: 0 K/UL
NRBC BLD AUTO-RTO: 0 /100 WBC
PLATELET # BLD AUTO: 208 K/UL (ref 164–446)
PMV BLD AUTO: 9.7 FL (ref 9–12.9)
POTASSIUM SERPL-SCNC: 3.4 MMOL/L (ref 3.6–5.5)
PROT SERPL-MCNC: 6.6 G/DL (ref 6–8.2)
PROTHROMBIN TIME: 12.9 SEC (ref 12–14.6)
RBC # BLD AUTO: 3.64 M/UL (ref 4.2–5.4)
SODIUM SERPL-SCNC: 144 MMOL/L (ref 135–145)
WBC # BLD AUTO: 7.2 K/UL (ref 4.8–10.8)

## 2017-05-10 PROCEDURE — 85610 PROTHROMBIN TIME: CPT

## 2017-05-10 PROCEDURE — 93971 EXTREMITY STUDY: CPT | Mod: 26 | Performed by: SURGERY

## 2017-05-10 PROCEDURE — 85025 COMPLETE CBC W/AUTO DIFF WBC: CPT

## 2017-05-10 PROCEDURE — 93931 UPPER EXTREMITY STUDY: CPT

## 2017-05-10 PROCEDURE — 99284 EMERGENCY DEPT VISIT MOD MDM: CPT

## 2017-05-10 PROCEDURE — 93971 EXTREMITY STUDY: CPT

## 2017-05-10 PROCEDURE — 80053 COMPREHEN METABOLIC PANEL: CPT

## 2017-05-10 PROCEDURE — 85730 THROMBOPLASTIN TIME PARTIAL: CPT

## 2017-05-10 PROCEDURE — 93926 LOWER EXTREMITY STUDY: CPT | Mod: 26 | Performed by: SURGERY

## 2017-05-10 NOTE — ED PROVIDER NOTES
"ED Provider Note    Scribed for Noe Pereyra M.D. by Arley Junior. 5/10/2017  12:57 PM    Primary care provider: Jorge Harrington M.D.  Means of arrival: Walk-in  History obtained from: Patient  History limited by: None    CHIEF COMPLAINT  Chief Complaint   Patient presents with   • Sent by MD   • Hand Swelling       HPI  Vivian Bajwa is a 51 y.o. female who presents to the Emergency Department as a transfer from [facility] with left handed numbness, discoloration and a sensation of \"tightness\" after undergoing a procedure yesterday.  She was informed by the referring physician that she might possibly have an occluded artery, though appears to have intact radial pulses.  The discomfort is localized to the affected region of her hand and does not radiate elsewhere.  The patient returned to the hospital today due to the decline in the condition of her hand since the operation.  En route, the patient was administered heparin.  She denies any associated shortness of breath, chest pain or headaches.      REVIEW OF SYSTEMS  Pertinent positives include left handed numbness, discoloration and a sensation of \"tightness\". Pertinent negatives include no shortness of breath, chest pain or headaches.  All other systems reviewed and negative.    PAST MEDICAL HISTORY   has a past medical history of Palpitations; Gilbert's disease; GERD (gastroesophageal reflux disease); Vocal cord nodules; Closed head injury (2011); Skull fracture (CMS-HCC) (2011); Femur fracture (CMS-HCC); Metastatic colon cancer to liver (CMS-HCC); Bowel habit changes; PMDD (premenstrual dysphoric disorder); Panic disorder; Cancer (CMS-HCC) (2016); and Dog bite (4/15/17).    SURGICAL HISTORY   has past surgical history that includes exploratory laparotomy (10/27/2016); colon resection (10/27/2016); liver biopsy (10/27/2016); cath placement (Right, 11/1/2016); and other orthopedic surgery.    SOCIAL HISTORY  Social History   Substance " "Use Topics   • Smoking status: Former Smoker -- 0.25 packs/day for 20 years     Types: Cigarettes     Quit date: 10/26/2016   • Smokeless tobacco: Never Used   • Alcohol Use: Yes      Comment: 4 per week      History   Drug Use No       FAMILY HISTORY  Family History   Problem Relation Age of Onset   • Heart Disease Mother    • Cancer Father      esoph       CURRENT MEDICATIONS  No current facility-administered medications on file prior to encounter.     Current Outpatient Prescriptions on File Prior to Encounter   Medication Sig Dispense Refill   • ibuprofen (MOTRIN) 200 MG Tab Take 200 mg by mouth every 6 hours as needed.     • NON SPECIFIED by Intravenous route every 14 days. Folfiri chemotherapy     • doxycycline (VIBRAMYCIN) 100 MG Tab Take 1 Tab by mouth 2 times a day. (Patient taking differently: Take 100 mg by mouth 2 times a day. Taking for dog bite (4/15/17)) 20 Tab 0   • alprazolam (XANAX) 0.25 MG Tab Take 1 Tab by mouth 3 times a day as needed for Sleep. 90 Tab 3   • ondansetron (ZOFRAN ODT) 4 MG TABLET DISPERSIBLE Take 4 mg by mouth every 8 hours as needed for Nausea/Vomiting.     • oxycodone-acetaminophen (PERCOCET) 5-325 MG Tab Take 1 Tab by mouth every bedtime.         ALLERGIES  Allergies   Allergen Reactions   • Pcn [Penicillins]      Unknown allergy,rxn = as a kid  Tolerates cephalosporins     • Lexapro Unspecified     Heart raced   • Paxil [Paroxetine] Unspecified     Heart raced   • Zoloft Unspecified     Heart raced         PHYSICAL EXAM  VITAL SIGNS: /85 mmHg  Pulse 66  Temp(Src) 37.4 °C (99.3 °F) (Temporal)  Resp 16  Ht 1.6 m (5' 3\")  SpO2 98%  LMP 10/01/2016    Vital signs reviewed.  Constitutional: Alert in no apparent distress.  HENT: No signs of trauma, Bilateral external ears normal, Nose normal.    Neck: Normal range of motion, No tenderness, Supple, No stridor.   Lymphatic: No lymphadenopathy noted.   Cardiovascular: Regular rate and rhythm, no murmurs.   Thorax & Lungs: " Normal breath sounds, No respiratory distress, No wheezing, No chest tenderness.   Abdomen: Bowel sounds normal, Soft, No tenderness, No peritoneal signs, No masses, No pulsatile masses.   Skin: Warm, Dry, No erythema, No rash.   Ecchymosis over the ventral surface of the thumb and the thenar eminence. Good cap refill on all 5 fingers  Back: No bony tenderness, No CVA tenderness.   Musculoskeletal: Good range of motion in all major joints. No tenderness to palpation or major deformities noted.  Intact radial pulse distal to the arterial puncture site.  Intact ulnar arterial pulse.   Neurologic: Alert , Normal motor function, Normal sensory function, No focal deficits noted.   Psychiatric: Affect normal, Judgment normal, Mood normal.     LABS  Results for orders placed or performed during the hospital encounter of 05/10/17   CBC WITH DIFFERENTIAL   Result Value Ref Range    WBC 7.2 4.8 - 10.8 K/uL    RBC 3.64 (L) 4.20 - 5.40 M/uL    Hemoglobin 12.5 12.0 - 16.0 g/dL    Hematocrit 36.9 (L) 37.0 - 47.0 %    .4 (H) 81.4 - 97.8 fL    MCH 34.3 (H) 27.0 - 33.0 pg    MCHC 33.9 33.6 - 35.0 g/dL    RDW 51.5 (H) 35.9 - 50.0 fL    Platelet Count 208 164 - 446 K/uL    MPV 9.7 9.0 - 12.9 fL    Neutrophils-Polys 57.00 44.00 - 72.00 %    Lymphocytes 34.90 22.00 - 41.00 %    Monocytes 6.70 0.00 - 13.40 %    Eosinophils 0.80 0.00 - 6.90 %    Basophils 0.30 0.00 - 1.80 %    Immature Granulocytes 0.30 0.00 - 0.90 %    Nucleated RBC 0.00 /100 WBC    Neutrophils (Absolute) 4.11 2.00 - 7.15 K/uL    Lymphs (Absolute) 2.51 1.00 - 4.80 K/uL    Monos (Absolute) 0.48 0.00 - 0.85 K/uL    Eos (Absolute) 0.06 0.00 - 0.51 K/uL    Baso (Absolute) 0.02 0.00 - 0.12 K/uL    Immature Granulocytes (abs) 0.02 0.00 - 0.11 K/uL    NRBC (Absolute) 0.00 K/uL   COMP METABOLIC PANEL   Result Value Ref Range    Sodium 144 135 - 145 mmol/L    Potassium 3.4 (L) 3.6 - 5.5 mmol/L    Chloride 111 96 - 112 mmol/L    Co2 26 20 - 33 mmol/L    Anion Gap 7.0 0.0 -  11.9    Glucose 93 65 - 99 mg/dL    Bun 7 (L) 8 - 22 mg/dL    Creatinine 0.63 0.50 - 1.40 mg/dL    Calcium 9.5 8.5 - 10.5 mg/dL    AST(SGOT) 49 (H) 12 - 45 U/L    ALT(SGPT) 70 (H) 2 - 50 U/L    Alkaline Phosphatase 192 (H) 30 - 99 U/L    Total Bilirubin 1.3 0.1 - 1.5 mg/dL    Albumin 4.1 3.2 - 4.9 g/dL    Total Protein 6.6 6.0 - 8.2 g/dL    Globulin 2.5 1.9 - 3.5 g/dL    A-G Ratio 1.6 g/dL   APTT   Result Value Ref Range    APTT 39.6 (H) 24.7 - 36.0 sec   PROTHROMBIN TIME (INR)   Result Value Ref Range    PT 12.9 12.0 - 14.6 sec    INR 0.94 0.87 - 1.13   ESTIMATED GFR   Result Value Ref Range    GFR If African American >60 >60 mL/min/1.73 m 2    GFR If Non African American >60 >60 mL/min/1.73 m 2       All labs reviewed by me.    RADIOLOGY  UE ART DUPLX/IMAG (Specify in Comments Left, Right Or Bilateral)   Preliminary Result      UE VENOUS DUPLEX (Specify in Comments Left, Right Or Bilateral)   Preliminary Result          Preliminary results did not show any sign of DVT or arterial injury. Both the radial and ulnar arteries are intact  The radiologist's interpretation of all radiological studies have been reviewed by me.    COURSE & MEDICAL DECISION MAKING  Pertinent Labs & Imaging studies reviewed. (See chart for details)     12:57 PM - Patient seen and examined at bedside. Ordered APTT, Prothrombin Time (INR), CBC with differential, CMP, UE Arterial Duplex Imag, UE Venous duplex to evaluate her symptoms. The differential diagnoses include but are not limited to: DVT    1:08 PM I discussed the patient's case and the above findings with Dr. Storm (Interventional Radiology) who is on call for Dr. Burk who advises ultrasound to rule out arterial injury as well as DVT.    3:24 PM - Rechecked on the patient and discussed the results of the Ultrasound which returned normal.  I checked the patient's capillary refill which appeared to be normal as well. All 5 digits had excellent cap refill. I advised to return if there  is any worsening of the condition.  She will be discharged.    3:27 PM - Called Dr. Burk and advised of the plan for discharge.        The patient will return for new or worsening symptoms and is stable at the time of discharge.    The patient is referred to a primary physician for blood pressure management, diabetic screening, and for all other preventative health concerns.    DISPOSITION:  Patient will be discharged home in stable condition.    FOLLOW UP:  [facility], Emergency Dept  [facility] [facility] [address] [phone]  In 1 day  As needed, If symptoms worsen      FINAL IMPRESSION  1. Ecchymosis          Arley WOLF (Scribe), am scribing for, and in the presence of, Noe Pereyra M.D..    Electronically signed by: Arley Junior (Scribe), 5/10/2017    Noe WOLF M.D. personally performed the services described in this documentation, as scribed by Arley Junior in my presence, and it is both accurate and complete.    The note accurately reflects work and decisions made by me.  Vaibhav Foster  5/10/2017  5:01 PM

## 2017-05-10 NOTE — ED AVS SNAPSHOT
Home Care Instructions                                                                                                                Vivian Bajwa   MRN: [id number]    Department:  [facility], Emergency Dept   Date of Visit:  5/10/2017            [facility], Emergency Dept    [address] [place] [address]    Phone:  [phone]      You were seen by     1. Vaibhav Foster M.D.    2. Noe Pereyra M.D.      Your Diagnosis Was     Ecchymosis     R58       Follow-up Information     1. Follow up with [facility], Emergency Dept In 1 day.    Specialty:  Emergency Medicine    Why:  As needed, If symptoms worsen    Contact information    [address] [place] [id number] [phone]      Medication Information     Review all of your home medications and newly ordered medications with your primary doctor and/or pharmacist as soon as possible. Follow medication instructions as directed by your doctor and/or pharmacist.     Please keep your complete medication list with you and share with your physician. Update the information when medications are discontinued, doses are changed, or new medications (including over-the-counter products) are added; and carry medication information at all times in the event of emergency situations.               Medication List      ASK your doctor about these medications        Instructions    Morning Afternoon Evening Bedtime    alprazolam 0.25 MG Tabs   Commonly known as:  XANAX        Take 1 Tab by mouth 3 times a day as needed for Sleep.   Dose:  0.25 mg                        doxycycline 100 MG Tabs   Commonly known as:  VIBRAMYCIN        Take 1 Tab by mouth 2 times a day.   Dose:  100 mg                        ibuprofen 200 MG Tabs   Commonly known as:  MOTRIN        Take 200 mg by mouth every 6 hours as needed.   Dose:  200 mg                        NON SPECIFIED        by Intravenous route every  14 days. Folfiri chemotherapy                        ondansetron 4 MG Tbdp   Commonly known as:  ZOFRAN ODT        Take 4 mg by mouth every 8 hours as needed for Nausea/Vomiting.   Dose:  4 mg                        oxycodone-acetaminophen 5-325 MG Tabs   Commonly known as:  PERCOCET        Take 1 Tab by mouth every bedtime.   Dose:  1 Tab                                Procedures and tests performed during your visit     APTT    CBC WITH DIFFERENTIAL    COMP METABOLIC PANEL    ESTIMATED GFR    IV SALINE LOCK    PROTHROMBIN TIME (INR)    UE ART DUPLX/IMAG (Specify in Comments Left, Right Or Bilateral)    UE VENOUS DUPLEX (Specify in Comments Left, Right Or Bilateral)        Discharge Instructions       Hematoma  A hematoma is a collection of blood. The collection of blood can turn into a hard, painful lump under the skin. Your skin may turn blue or yellow if the hematoma is close to the surface of the skin. Most hematomas get better in a few days to weeks. Some hematomas are serious and need medical care. Hematomas can be very small or very big.  HOME CARE  · Apply ice to the injured area:  ¨ Put ice in a plastic bag.  ¨ Place a towel between your skin and the bag.  ¨ Leave the ice on for 20 minutes, 2-3 times a day for the first 1 to 2 days.  · After the first 2 days, switch to using warm packs on the injured area.  · Raise (elevate) the injured area to lessen pain and puffiness (swelling). You may also wrap the area with an elastic bandage. Make sure the bandage is not wrapped too tight.  · If you have a painful hematoma on your leg or foot, you may use crutches for a couple days.  · Only take medicines as told by your doctor.  GET HELP RIGHT AWAY IF:   · Your pain gets worse.  · Your pain is not controlled with medicine.  · You have a fever.  · Your puffiness gets worse.  · Your skin turns more blue or yellow.  · Your skin over the hematoma breaks or starts bleeding.  · Your hematoma is in your chest or belly  (abdomen) and you are short of breath, feel weak, or have a change in consciousness.  · Your hematoma is on your scalp and you have a headache that gets worse or a change in alertness or consciousness.  MAKE SURE YOU:   · Understand these instructions.  · Will watch your condition.  · Will get help right away if you are not doing well or get worse.     This information is not intended to replace advice given to you by your health care provider. Make sure you discuss any questions you have with your health care provider.     Document Released: 01/25/2006 Document Revised: 08/20/2014 Document Reviewed: 05/28/2014  [employer] Interactive Patient Education ©2016 [employer]            Patient Information     Patient Information    Following emergency treatment: all patient requiring follow-up care must return either to a private physician or a clinic if your condition worsens before you are able to obtain further medical attention, please return to the emergency room.     Billing Information    At [facility], we work to make the billing process streamlined for our patients.  Our Representatives are here to answer any questions you may have regarding your hospital bill.  If you have insurance coverage and have supplied your insurance information to us, we will submit a claim to your insurer on your behalf.  Should you have any questions regarding your bill, we can be reached online or by phone as follows:  Online: You are able pay your bills online or live chat with our representatives about any billing questions you may have. We are here to help Monday - Friday from 8:00am to 7:30pm and 9:00am - 12:00pm on Saturdays.  Please visit [email or web address]  for more information.   Phone:  [phone] or [phone]    Please note that your emergency physician, surgeon, pathologist, radiologist, anesthesiologist, and other specialists are not employed by [facility] and will therefore bill  separately for their services.  Please contact them directly for any questions concerning their bills at the numbers below:     Emergency Physician Services:  [phone]  [facility]:  [phone]  [facility]:  [phone]  [facility]:  [phone]    1. Your final bill may vary from the amount quoted upon discharge if all procedures are not complete at that time, or if your doctor has additional procedures of which we are not aware. You will receive an additional bill if you return to the Emergency Department at [facility] hospital for suture removal regardless of the facility of which the sutures were placed.     2. Please arrange for settlement of this account at the emergency registration.    3. All self-pay accounts are due in full at the time of treatment.  If you are unable to meet this obligation then payment is expected within 4-5 days.     4. If you have had radiology studies (CT, X-ray, Ultrasound, MRI), you have received a preliminary result during your emergency department visit. Please contact the radiology department [phone] to receive a copy of your final result. Please discuss the Final result with your primary physician or with the follow up physician provided.     Crisis Hotline:  [place] Crisis Hotline:  1-869-EYIFFUF or 1-517.628.7411  [place] Crisis Hotline:    1-741.812.5062 or 409-330-8614         ED Discharge Follow Up Questions    1. In order to provide you with very good care, we would like to follow up with a phone call in the next few days.  May we have your permission to contact you?     YES /  NO    2. What is the best phone number to call you? (       )_____-__________    3. What is the best time to call you?      Morning  /  Afternoon  /  Evening                   Patient Signature:  ____________________________________________________________    Date:  ____________________________________________________________      Your  appointments     May 15, 2017 10:00 AM   INTERVENTIONAL EXAM-2 HOURS with [facility] IR 2, RADIOLOGIST, INTERVENTIONAL, [facility], NURSE, IR IMAGING(INTERVENTIONAL)   [facility] IMAGING - INTERVENTIONAL - [facility]    [address] [address]   [phone]            May 15, 2017  6:00 PM   NM LIVER/SPLEEN SCAN with [facility] NM FORTE 2   [facility] IMAGING - NUC MED [facility] ([facility]    [address] [place] [id number]   [phone]

## 2017-05-10 NOTE — ED AVS SNAPSHOT
TransCure bioServices Access Code: Activation code not generated  Current TransCure bioServices Status: Active    [email or web address]  A secure, online tool to manage your health information     [employer]’s TransCure bioServices® is a secure, online tool that connects you to your personalized health information from the privacy of your home -- day or night - making it very easy for you to manage your healthcare. Once the activation process is completed, you can even access your medical information using the TransCure bioServices angel, which is available for free in the Apple Angel store or Google Play store.     TransCure bioServices provides the following levels of access (as shown below):   My Chart Features   [facility] Primary Care Doctor [facility]  Specialists [facility]  Urgent  Care Non-[facility]  Primary Care  Doctor   Email your healthcare team securely and privately 24/7 X X X X   Manage appointments: schedule your next appointment; view details of past/upcoming appointments X      Request prescription refills. X      View recent personal medical records, including lab and immunizations X X X X   View health record, including health history, allergies, medications X X X X   Read reports about your outpatient visits, procedures, consult and ER notes X X X X   See your discharge summary, which is a recap of your hospital and/or ER visit that includes your diagnosis, lab results, and care plan. X X       How to register for TransCure bioServices:  1. Go to  [email or web address].  2. Click on the Sign Up Now box, which takes you to the New Member Sign Up page. You will need to provide the following information:  a. Enter your TransCure bioServices Access Code exactly as it appears at the top of this page. (You will not need to use this code after you’ve completed the sign-up process. If you do not sign up before the expiration date, you must request a new code.)   b. Enter your date of birth.   c. Enter your home email address.   d. Click Submit, and follow the next screen’s instructions.  3. Create a TransCure bioServices ID. This will  be your Applause login ID and cannot be changed, so think of one that is secure and easy to remember.  4. Create a Applause password. You can change your password at any time.  5. Enter your Password Reset Question and Answer. This can be used at a later time if you forget your password.   6. Enter your e-mail address. This allows you to receive e-mail notifications when new information is available in Applause.  7. Click Sign Up. You can now view your health information.    For assistance activating your Applause account, call [phone]

## 2017-05-10 NOTE — DISCHARGE INSTRUCTIONS
Hematoma  A hematoma is a collection of blood. The collection of blood can turn into a hard, painful lump under the skin. Your skin may turn blue or yellow if the hematoma is close to the surface of the skin. Most hematomas get better in a few days to weeks. Some hematomas are serious and need medical care. Hematomas can be very small or very big.  HOME CARE  · Apply ice to the injured area:  ¨ Put ice in a plastic bag.  ¨ Place a towel between your skin and the bag.  ¨ Leave the ice on for 20 minutes, 2-3 times a day for the first 1 to 2 days.  · After the first 2 days, switch to using warm packs on the injured area.  · Raise (elevate) the injured area to lessen pain and puffiness (swelling). You may also wrap the area with an elastic bandage. Make sure the bandage is not wrapped too tight.  · If you have a painful hematoma on your leg or foot, you may use crutches for a couple days.  · Only take medicines as told by your doctor.  GET HELP RIGHT AWAY IF:   · Your pain gets worse.  · Your pain is not controlled with medicine.  · You have a fever.  · Your puffiness gets worse.  · Your skin turns more blue or yellow.  · Your skin over the hematoma breaks or starts bleeding.  · Your hematoma is in your chest or belly (abdomen) and you are short of breath, feel weak, or have a change in consciousness.  · Your hematoma is on your scalp and you have a headache that gets worse or a change in alertness or consciousness.  MAKE SURE YOU:   · Understand these instructions.  · Will watch your condition.  · Will get help right away if you are not doing well or get worse.     This information is not intended to replace advice given to you by your health care provider. Make sure you discuss any questions you have with your health care provider.     Document Released: 01/25/2006 Document Revised: 08/20/2014 Document Reviewed: 05/28/2014  [employer] Interactive Patient Education ©2016 [employer]

## 2017-05-10 NOTE — PROGRESS NOTES
Late Entry:  IR Note:  Received call from patient.  Patient had Y90 mapping completed yesterday (5/9) through left radial access.  Patient stated her fingers on her left hand were slightly swollen, pale and cool to the touch.  After further discussing with patient, patient denies numbness or tingling and did have full movement of the hand and fingers.  Discussed this with Dr Kelly who stated patient needed to go to the emergency room and be further assessed.  Relayed to the patient she needed to go the the emergency room and be seen by a physician.  Patient asked about going to urgent care, told patient she needed to go to the emergency room.  Patient stated she would go the the emergency room at this time.

## 2017-05-10 NOTE — ED NOTES
"Pt comes in from [facility] with c/c of right hand numbness, discoloration, and \"tightness\" after a procedure down yesterday here. Pt does have an IV and was given heparin PTA.   "

## 2017-05-11 ENCOUNTER — TELEPHONE (OUTPATIENT)
Dept: RADIOLOGY | Facility: MEDICAL CENTER | Age: 52
End: 2017-05-11

## 2017-05-11 NOTE — TELEPHONE ENCOUNTER
Pt contacted APRN regarding repeating labs. Had labs drawn 5/10 that are within treatment parameters for Y90 SIRT, which is scheduled for 5/15. No repeat draw needed.

## 2017-05-14 RX ORDER — DEXAMETHASONE SODIUM PHOSPHATE 4 MG/ML
8 INJECTION, SOLUTION INTRA-ARTICULAR; INTRALESIONAL; INTRAMUSCULAR; INTRAVENOUS; SOFT TISSUE
Status: COMPLETED | OUTPATIENT
Start: 2017-05-15 | End: 2017-05-15

## 2017-05-15 ENCOUNTER — HOSPITAL ENCOUNTER (OUTPATIENT)
Facility: MEDICAL CENTER | Age: 52
End: 2017-05-15
Attending: RADIOLOGY | Admitting: RADIOLOGY
Payer: COMMERCIAL

## 2017-05-15 ENCOUNTER — APPOINTMENT (OUTPATIENT)
Dept: RADIOLOGY | Facility: MEDICAL CENTER | Age: 52
End: 2017-05-15
Attending: RADIOLOGY
Payer: COMMERCIAL

## 2017-05-15 VITALS
OXYGEN SATURATION: 94 % | DIASTOLIC BLOOD PRESSURE: 78 MMHG | RESPIRATION RATE: 16 BRPM | HEIGHT: 63 IN | BODY MASS INDEX: 19.38 KG/M2 | HEART RATE: 60 BPM | TEMPERATURE: 99.3 F | WEIGHT: 109.35 LBS | SYSTOLIC BLOOD PRESSURE: 119 MMHG

## 2017-05-15 DIAGNOSIS — C78.7 METASTATIC COLON CANCER TO LIVER (HCC): ICD-10-CM

## 2017-05-15 DIAGNOSIS — C18.9 METASTATIC COLON CANCER TO LIVER (HCC): ICD-10-CM

## 2017-05-15 PROBLEM — C22.9 LIVER CANCER (HCC): Status: ACTIVE | Noted: 2017-05-15

## 2017-05-15 PROCEDURE — 37243 VASC EMBOLIZE/OCCLUDE ORGAN: CPT

## 2017-05-15 PROCEDURE — 700111 HCHG RX REV CODE 636 W/ 250 OVERRIDE (IP)

## 2017-05-15 PROCEDURE — 36247 INS CATH ABD/L-EXT ART 3RD: CPT

## 2017-05-15 PROCEDURE — 76937 US GUIDE VASCULAR ACCESS: CPT

## 2017-05-15 PROCEDURE — 700105 HCHG RX REV CODE 258: Performed by: NURSE PRACTITIONER

## 2017-05-15 PROCEDURE — 99153 MOD SED SAME PHYS/QHP EA: CPT

## 2017-05-15 PROCEDURE — 78215 LVR&SPLEEN IMG STATIC ONLY: CPT

## 2017-05-15 PROCEDURE — 700117 HCHG RX CONTRAST REV CODE 255: Performed by: RADIOLOGY

## 2017-05-15 PROCEDURE — 160002 HCHG RECOVERY MINUTES (STAT)

## 2017-05-15 PROCEDURE — 700111 HCHG RX REV CODE 636 W/ 250 OVERRIDE (IP): Performed by: NURSE PRACTITIONER

## 2017-05-15 RX ORDER — MORPHINE SULFATE 4 MG/ML
4 INJECTION, SOLUTION INTRAMUSCULAR; INTRAVENOUS
Status: DISCONTINUED | OUTPATIENT
Start: 2017-05-15 | End: 2017-05-15 | Stop reason: HOSPADM

## 2017-05-15 RX ORDER — SODIUM CHLORIDE 9 MG/ML
500 INJECTION, SOLUTION INTRAVENOUS
Status: ACTIVE | OUTPATIENT
Start: 2017-05-15 | End: 2017-05-15

## 2017-05-15 RX ORDER — DOCUSATE SODIUM 100 MG/1
100 CAPSULE, LIQUID FILLED ORAL PRN
COMMUNITY

## 2017-05-15 RX ORDER — HEPARIN SODIUM,PORCINE 1000/ML
VIAL (ML) INJECTION
Status: COMPLETED
Start: 2017-05-15 | End: 2017-05-15

## 2017-05-15 RX ORDER — MIDAZOLAM HYDROCHLORIDE 1 MG/ML
.5-2 INJECTION INTRAMUSCULAR; INTRAVENOUS PRN
Status: ACTIVE | OUTPATIENT
Start: 2017-05-15 | End: 2017-05-15

## 2017-05-15 RX ORDER — ONDANSETRON 2 MG/ML
INJECTION INTRAMUSCULAR; INTRAVENOUS
Status: COMPLETED
Start: 2017-05-15 | End: 2017-05-15

## 2017-05-15 RX ORDER — OXYCODONE HYDROCHLORIDE 5 MG/1
5 TABLET ORAL EVERY 4 HOURS PRN
COMMUNITY
End: 2019-07-10

## 2017-05-15 RX ORDER — VERAPAMIL HYDROCHLORIDE 2.5 MG/ML
INJECTION, SOLUTION INTRAVENOUS
Status: COMPLETED
Start: 2017-05-15 | End: 2017-05-15

## 2017-05-15 RX ORDER — ONDANSETRON 2 MG/ML
4 INJECTION INTRAMUSCULAR; INTRAVENOUS EVERY 8 HOURS PRN
Status: DISCONTINUED | OUTPATIENT
Start: 2017-05-15 | End: 2017-05-15 | Stop reason: HOSPADM

## 2017-05-15 RX ORDER — OXYCODONE HYDROCHLORIDE 5 MG/1
10 TABLET ORAL
Status: DISCONTINUED | OUTPATIENT
Start: 2017-05-15 | End: 2017-05-15 | Stop reason: HOSPADM

## 2017-05-15 RX ORDER — SODIUM CHLORIDE 9 MG/ML
INJECTION, SOLUTION INTRAVENOUS
Status: DISCONTINUED | OUTPATIENT
Start: 2017-05-15 | End: 2017-05-15 | Stop reason: HOSPADM

## 2017-05-15 RX ORDER — OXYCODONE HYDROCHLORIDE 5 MG/1
5 TABLET ORAL
Status: DISCONTINUED | OUTPATIENT
Start: 2017-05-15 | End: 2017-05-15 | Stop reason: HOSPADM

## 2017-05-15 RX ORDER — ONDANSETRON 2 MG/ML
4 INJECTION INTRAMUSCULAR; INTRAVENOUS PRN
Status: ACTIVE | OUTPATIENT
Start: 2017-05-15 | End: 2017-05-15

## 2017-05-15 RX ORDER — MIDAZOLAM HYDROCHLORIDE 1 MG/ML
INJECTION INTRAMUSCULAR; INTRAVENOUS
Status: COMPLETED
Start: 2017-05-15 | End: 2017-05-15

## 2017-05-15 RX ADMIN — SODIUM CHLORIDE: 9 INJECTION, SOLUTION INTRAVENOUS at 08:35

## 2017-05-15 RX ADMIN — FENTANYL CITRATE 50 MCG: 50 INJECTION, SOLUTION INTRAMUSCULAR; INTRAVENOUS at 10:40

## 2017-05-15 RX ADMIN — MIDAZOLAM HYDROCHLORIDE 1 MG: 1 INJECTION INTRAMUSCULAR; INTRAVENOUS at 10:41

## 2017-05-15 RX ADMIN — ONDANSETRON 4 MG: 2 INJECTION, SOLUTION INTRAMUSCULAR; INTRAVENOUS at 10:29

## 2017-05-15 RX ADMIN — IOHEXOL 45 ML: 300 INJECTION, SOLUTION INTRAVENOUS at 11:38

## 2017-05-15 RX ADMIN — CEFTRIAXONE SODIUM 1 G: 1 INJECTION, POWDER, FOR SOLUTION INTRAMUSCULAR; INTRAVENOUS at 09:34

## 2017-05-15 RX ADMIN — ONDANSETRON HYDROCHLORIDE 16 MG: 2 SOLUTION INTRAMUSCULAR; INTRAVENOUS at 08:42

## 2017-05-15 RX ADMIN — MIDAZOLAM 2 MG: 1 INJECTION INTRAMUSCULAR; INTRAVENOUS at 10:26

## 2017-05-15 RX ADMIN — FENTANYL CITRATE 50 MCG: 50 INJECTION, SOLUTION INTRAMUSCULAR; INTRAVENOUS at 10:33

## 2017-05-15 RX ADMIN — MIDAZOLAM HYDROCHLORIDE 1 MG: 1 INJECTION INTRAMUSCULAR; INTRAVENOUS at 10:47

## 2017-05-15 RX ADMIN — HEPARIN SODIUM 3000 UNITS: 1000 INJECTION, SOLUTION INTRAVENOUS; SUBCUTANEOUS at 11:05

## 2017-05-15 RX ADMIN — HEPARIN 500 UNITS: 100 SYRINGE at 14:05

## 2017-05-15 RX ADMIN — DEXAMETHASONE SODIUM PHOSPHATE 8 MG: 4 INJECTION, SOLUTION INTRAMUSCULAR; INTRAVENOUS at 09:08

## 2017-05-15 RX ADMIN — NITROGLYCERIN 100 MCG: 20 INJECTION INTRAVENOUS at 10:59

## 2017-05-15 RX ADMIN — MIDAZOLAM HYDROCHLORIDE 1 MG: 1 INJECTION INTRAMUSCULAR; INTRAVENOUS at 10:38

## 2017-05-15 RX ADMIN — MIDAZOLAM HYDROCHLORIDE 1 MG: 1 INJECTION INTRAMUSCULAR; INTRAVENOUS at 10:34

## 2017-05-15 RX ADMIN — VERAPAMIL HYDROCHLORIDE 2.5 MG: 2.5 INJECTION, SOLUTION INTRAVENOUS at 11:06

## 2017-05-15 RX ADMIN — ONDANSETRON 4 MG: 2 INJECTION INTRAMUSCULAR; INTRAVENOUS at 10:29

## 2017-05-15 RX ADMIN — MIDAZOLAM HYDROCHLORIDE 2 MG: 1 INJECTION INTRAMUSCULAR; INTRAVENOUS at 10:26

## 2017-05-15 ASSESSMENT — PAIN SCALES - GENERAL
PAINLEVEL_OUTOF10: 0

## 2017-05-15 NOTE — PROGRESS NOTES
Y90 SIRT to left lobe liver performed by Dr Lamb via left radial access. Patient tolerated procedure well and was returned to [facility] after imaging in Nuclear Medicine;  updated and at bedside. TR band at left radial inflated to 13 ml; catheter insertion site CDI.

## 2017-05-15 NOTE — IP AVS SNAPSHOT
5/15/2017    Vivian Bajwa   [address] [address]  [facility] [address]    Dear Vivian:    [facility] wants to ensure your discharge home is safe and you or your loved ones have had all of your questions answered regarding your care after you leave the hospital.    Below is a list of resources and contact information should you have any questions regarding your hospital stay, follow-up instructions, or active medical symptoms.    Questions or Concerns Regarding… Contact   Medical Questions Related to Your Discharge  (7 days a week, 8am-5pm) Contact a Nurse Care Coordinator   [phone]   Medical Questions Not Related to Your Discharge  (24 hours a day / 7 days a week)  Contact the Nurse Health Line   [phone]    Medications or Discharge Instructions Refer to your discharge packet   or contact your [facility] Primary Care Provider   [phone]   Follow-up Appointment(s) Schedule your appointment via [employer]   or contact Scheduling [phone]   Billing Review your statement via [employer]  or contact Billing [phone]   Medical Records Review your records via [employer]   or contact Medical Records [phone]     You may receive a telephone call within two days of discharge. This call is to make certain you understand your discharge instructions and have the opportunity to have any questions answered. You can also easily access your medical information, test results and upcoming appointments via the [employer] free online health management tool. You can learn more and sign up at [employer]. For assistance setting up your [employer] account, please call [phone].    Once again, we want to ensure your discharge home is safe and that you have a clear understanding of any next steps in your care. If you have any questions or concerns, please do not hesitate to contact us, we are here for you. Thank you for choosing [facility] for your healthcare needs.    Sincerely,    Your [facility] Healthcare Team

## 2017-05-15 NOTE — OR NURSING
1210 Received report from Keegan LARSON and care was assumed . Patient awae and alert . With TR band to left radial inflated. Site w/o bleeding or hematoma. With good pulse ox waveform to distal finger. And patient able to move all fingers.  at bedside,   1245 Released first 3 ml of air from TR band- no bleeding or hematoma. Given po fluids and snacks.  1300 Released more air off TR band, no bleeding.  1312 Dr Lamb here checked and talked to patient.  1330 Removed more air off TR band, no bleeding.  1345 Removed more air off TR band, no bleeding.  1400 TR band dc'd-no bleeding or hematoma. Gauze and tegaderm and coban dressing applied.   1415 Port was de accessed per protocol. Then assisted patient up to the BR- voided w/o difficulties.   1435 Dc'd home in stable condition with  and all belongings and Rx.

## 2017-05-15 NOTE — IP AVS SNAPSHOT
" Home Care Instructions                                                                                                                Name:Vivian Bajwa  Medical Record Number:[id number]  CSN: [id number]    YOB: 1965   Age: 51 y.o.  Sex: female  HT:1.6 m (5' 2.99\") WT: 49.6 kg (109 lb 5.6 oz)          Admit Date: 5/15/2017     Discharge Date:   Today's Date: 5/15/2017  Attending Doctor:  Yovanny Kaur M.D.                  Allergies:  Pcn; Lexapro; Paxil; and Zoloft                Discharge Instructions         ACTIVITY: Rest and take it easy for the first 24 hours.  A responsible adult is recommended to remain with you during that time.  It is normal to feel sleepy.  We encourage you to not do anything that requires balance, judgment or coordination.    MILD FLU-LIKE SYMPTOMS ARE NORMAL. YOU MAY EXPERIENCE GENERALIZED MUSCLE ACHES, THROAT IRRITATION, HEADACHE AND/OR SOME NAUSEA.    FOR 24 HOURS DO NOT:  Drive, operate machinery or run household appliances.  Drink beer or alcoholic beverages.   Make important decisions or sign legal documents.    SPECIAL INSTRUCTIONS:LIMIT LEFT WRIST MOVEMENT FOR 48 HOURS, NO PULLING, PUSHING OR LIFTING MORE THAN 5 LBS. FOLLOW INSTRUCTIONS GIVEN BY DR KAUR AND RADIOLOGY RN REGARDING RADIATION IMPLANT AND THE ATTACHED LIVER BRACHYTHERAPY INSTRUCTIONS.    DIET: To avoid nausea, slowly advance diet as tolerated, avoiding spicy or greasy foods for the first day.  Add more substantial food to your diet according to your physician's instructions.  Babies can be fed formula or breast milk as soon as they are hungry.  INCREASE FLUIDS AND FIBER TO AVOID CONSTIPATION.    SURGICAL DRESSING/BATHING: KEEP DRESSING TO LEFT WRIST FOR 24 HOURS THEN REMOVE. MAY SHOWER BUT NO IMMERSION OF LEFT WRIST IN WATER FOR 1WEEK    FOLLOW-UP APPOINTMENT:  A follow-up appointment should be arranged with your doctor in 1 week; call to schedule.    You should CALL YOUR PHYSICIAN if you " develop:  Fever greater than 101 degrees F.  Pain not relieved by medication, or persistent nausea or vomiting.  Excessive bleeding (blood soaking through dressing) or unexpected drainage from the wound.  Extreme redness or swelling around the incision site, drainage of pus or foul smelling drainage.  Inability to urinate or empty your bladder within 8 hours.  Problems with breathing or chest pain.    You should call 911 if you develop problems with breathing or chest pain.  If you are unable to contact your doctor or surgical center, you should go to the nearest emergency room or urgent care center.  Physician's telephone #:dr reyes- [phone]    If any questions arise, call your doctor.  If your doctor is not available, please feel free to call the Surgical Center at [phone].  The Center is open Monday through Friday from 7AM to 7PM.  You can also call the [employer] HOTLINE open 24 hours/day, 7 days/week and speak to a nurse at [phone], or toll free at [phone].    A registered nurse may call you a few days after your surgery to see how you are doing after your procedure.    MEDICATIONS: Resume taking daily medication.  Take prescribed pain medication with food.  If no medication is prescribed, you may take non-aspirin pain medication if needed.  PAIN MEDICATION CAN BE VERY CONSTIPATING.  Take a stool softener or laxative such as senokot, pericolace, or milk of magnesia if needed.    Prescription given for medrol pack, oxycodone,ondansetron.  Last pain medication given at N/A.    If your physician has prescribed pain medication that includes Acetaminophen (Tylenol), do not take additional Acetaminophen (Tylenol) while taking the prescribed medication.    Depression / Suicide Risk    As you are discharged from this [facility] facility, it is important to learn how to keep safe from harming yourself.    Recognize the warning signs:  · Abrupt changes in personality, positive or negative- including  increase in energy   · Giving away possessions  · Change in eating patterns- significant weight changes-  positive or negative  · Change in sleeping patterns- unable to sleep or sleeping all the time   · Unwillingness or inability to communicate  · Depression  · Unusual sadness, discouragement and loneliness  · Talk of wanting to die  · Neglect of personal appearance   · Rebelliousness- reckless behavior  · Withdrawal from people/activities they love  · Confusion- inability to concentrate     If you or a loved one observes any of these behaviors or has concerns about self-harm, here's what you can do:  · Talk about it- your feelings and reasons for harming yourself  · Remove any means that you might use to hurt yourself (examples: pills, rope, extension cords, firearm)  · Get professional help from the community (Mental Health, Substance Abuse, psychological counseling)  · Do not be alone:Call your Safe Contact- someone whom you trust who will be there for you.  · Call your local CRISIS HOTLINE 595-6167 or 319-074-2778  · Call your local Children's Mobile Crisis Response Team [place] (937) 932-4892 or [employer]  · Call the toll free National Suicide Prevention Hotlines   · National Suicide Prevention Lifeline 719-515-VQXT (2302)  · National Hope Line Network 800-SUICIDE (055-4151)       Medication List      CONTINUE taking these medications        Instructions    Morning Afternoon Evening Bedtime    alprazolam 0.25 MG Tabs   Commonly known as:  XANAX        Take 1 Tab by mouth 3 times a day as needed for Sleep.   Dose:  0.25 mg                        docusate sodium 100 MG Caps   Commonly known as:  COLACE        Take 100 mg by mouth as needed for Constipation.   Dose:  100 mg                        ibuprofen 200 MG Tabs   Commonly known as:  MOTRIN        Take 200 mg by mouth every 6 hours as needed.   Dose:  200 mg                        NON SPECIFIED        by Intravenous route every 14 days.  Folfiri chemotherapy                        ondansetron 4 MG Tbdp   Commonly known as:  ZOFRAN ODT        Take 4 mg by mouth every 8 hours as needed for Nausea/Vomiting.   Dose:  4 mg                        oxycodone immediate-release 5 MG Tabs   Commonly known as:  ROXICODONE        Take 5 mg by mouth every four hours as needed for Severe Pain.   Dose:  5 mg                                Medication Information     Above and/or attached are the medications your physician expects you to take upon discharge. Review all of your home medications and newly ordered medications with your doctor and/or pharmacist. Follow medication instructions as directed by your doctor and/or pharmacist. Please keep your medication list with you and share with your physician. Update the information when medications are discontinued, doses are changed, or new medications (including over-the-counter products) are added; and carry medication information at all times in the event of emergency situations.        Resources     Quit Smoking / Tobacco Use:    I understand the use of any tobacco products increases my chance of suffering from future heart disease or stroke and could cause other illnesses which may shorten my life. Quitting the use of tobacco products is the single most important thing I can do to improve my health. For further information on smoking / tobacco cessation call a Toll Free Quit Line at 1-603.687.9671 (*[employer] [place]) or 1-859.999.1201 (American Lung Association) or you can access the web based program at www.lungusa.org.    [place] Tobacco Users Help Line:  (551) 138-9041       Toll Free: 1-753.174.4622  Quit Tobacco Program [facility] (617)419-0185    Crisis Hotline:    [place] Crisis Hotline:  7-550-EBAKPTT or 1-646.124.6617    [place] Crisis Hotline:    1-966.314.6070 or 721-764-5730    Discharge Survey:   Thank you for choosing [facility]. We hope we did everything we could to  make your hospital stay a pleasant one. You may be receiving a survey and we would appreciate your time and participation in answering the questions. Your input is very valuable to us in our efforts to improve our service to our patients and their families.            Signatures     My signature on this form indicates that:    1. I acknowledge receipt and understanding of these Home Care Instruction.  2. My questions regarding this information have been answered to my satisfaction.  3. I have formulated a plan with my discharge nurse to obtain my prescribed medications for home.    __________________________________      __________________________________                   Patient Signature                                 Guardian/Responsible Adult Signature      __________________________________                 __________       ________                       Nurse Signature                                               Date                 Time

## 2017-05-15 NOTE — OR SURGEON
Immediate Post- Operative Note        PostOp Diagnosis: mCRC.       Procedure(s): L lobe Y90      Estimated Blood Loss: Less than 5 ml        Complications: None            5/15/2017     9:54 AM     Yovanny Lamb

## 2017-05-15 NOTE — PROGRESS NOTES
RX with patient:  Ondansetron 4 mg ODT, po q8hr prn post procedure n/v  #20 no RF  Oxycodone 5 mg po 1-2 q4 hr prn post procedure pain #30 no RF  Medrol dose pack take as directed on pack for post procedure inflammation disp one, no RF, start tomorrow.    Post-radial access site care instructions per nursing staff.    Follow up in IR clinic in approximately 2 weeks, we will make appt. Pt to get labs drawn ~2 days prior. Patient has lab slip for draw at her local lab.

## 2017-05-15 NOTE — DISCHARGE INSTRUCTIONS
ACTIVITY: Rest and take it easy for the first 24 hours.  A responsible adult is recommended to remain with you during that time.  It is normal to feel sleepy.  We encourage you to not do anything that requires balance, judgment or coordination.    MILD FLU-LIKE SYMPTOMS ARE NORMAL. YOU MAY EXPERIENCE GENERALIZED MUSCLE ACHES, THROAT IRRITATION, HEADACHE AND/OR SOME NAUSEA.    FOR 24 HOURS DO NOT:  Drive, operate machinery or run household appliances.  Drink beer or alcoholic beverages.   Make important decisions or sign legal documents.    SPECIAL INSTRUCTIONS:LIMIT LEFT WRIST MOVEMENT FOR 48 HOURS, NO PULLING, PUSHING OR LIFTING MORE THAN 5 LBS. FOLLOW INSTRUCTIONS GIVEN BY DR KAUR AND RADIOLOGY RN REGARDING RADIATION IMPLANT AND THE ATTACHED LIVER BRACHYTHERAPY INSTRUCTIONS.    DIET: To avoid nausea, slowly advance diet as tolerated, avoiding spicy or greasy foods for the first day.  Add more substantial food to your diet according to your physician's instructions.  Babies can be fed formula or breast milk as soon as they are hungry.  INCREASE FLUIDS AND FIBER TO AVOID CONSTIPATION.    SURGICAL DRESSING/BATHING: KEEP DRESSING TO LEFT WRIST FOR 24 HOURS THEN REMOVE. MAY SHOWER BUT NO IMMERSION OF LEFT WRIST IN WATER FOR 1WEEK    FOLLOW-UP APPOINTMENT:  A follow-up appointment should be arranged with your doctor in 1 week; call to schedule.    You should CALL YOUR PHYSICIAN if you develop:  Fever greater than 101 degrees F.  Pain not relieved by medication, or persistent nausea or vomiting.  Excessive bleeding (blood soaking through dressing) or unexpected drainage from the wound.  Extreme redness or swelling around the incision site, drainage of pus or foul smelling drainage.  Inability to urinate or empty your bladder within 8 hours.  Problems with breathing or chest pain.    You should call 911 if you develop problems with breathing or chest pain.  If you are unable to contact your doctor or surgical center,  you should go to the nearest emergency room or urgent care center.  Physician's telephone #:dr reyes- [phone]    If any questions arise, call your doctor.  If your doctor is not available, please feel free to call the Surgical Center at [phone].  The Center is open Monday through Friday from 7AM to 7PM.  You can also call the HEALTH HOTLINE open 24 hours/day, 7 days/week and speak to a nurse at [phone], or toll free at [phone].    A registered nurse may call you a few days after your surgery to see how you are doing after your procedure.    MEDICATIONS: Resume taking daily medication.  Take prescribed pain medication with food.  If no medication is prescribed, you may take non-aspirin pain medication if needed.  PAIN MEDICATION CAN BE VERY CONSTIPATING.  Take a stool softener or laxative such as senokot, pericolace, or milk of magnesia if needed.    Prescription given for medrol pack, oxycodone,ondansetron.  Last pain medication given at N/A.    If your physician has prescribed pain medication that includes Acetaminophen (Tylenol), do not take additional Acetaminophen (Tylenol) while taking the prescribed medication.    Depression / Suicide Risk    As you are discharged from this [facility] facility, it is important to learn how to keep safe from harming yourself.    Recognize the warning signs:  · Abrupt changes in personality, positive or negative- including increase in energy   · Giving away possessions  · Change in eating patterns- significant weight changes-  positive or negative  · Change in sleeping patterns- unable to sleep or sleeping all the time   · Unwillingness or inability to communicate  · Depression  · Unusual sadness, discouragement and loneliness  · Talk of wanting to die  · Neglect of personal appearance   · Rebelliousness- reckless behavior  · Withdrawal from people/activities they love  · Confusion- inability to concentrate     If you or a loved one observes any of these  behaviors or has concerns about self-harm, here's what you can do:  · Talk about it- your feelings and reasons for harming yourself  · Remove any means that you might use to hurt yourself (examples: pills, rope, extension cords, firearm)  · Get professional help from the community (Mental Health, Substance Abuse, psychological counseling)  · Do not be alone:Call your Safe Contact- someone whom you trust who will be there for you.  · Call your local CRISIS HOTLINE 104-9171 or 526-496-8784  · Call your local Children's Mobile Crisis Response Team [place] (815) 227-7337 or www.[employer]  · Call the toll free National Suicide Prevention Hotlines   · National Suicide Prevention Lifeline 767-953-VQSF (5493)  · National Hope Line Network 800-SUICIDE (375-2136)

## 2017-05-16 DIAGNOSIS — C18.9 METASTATIC COLON CANCER TO LIVER (HCC): ICD-10-CM

## 2017-05-16 DIAGNOSIS — C78.7 METASTATIC COLON CANCER TO LIVER (HCC): ICD-10-CM

## 2017-05-31 ENCOUNTER — HOSPITAL ENCOUNTER (OUTPATIENT)
Dept: RADIOLOGY | Facility: MEDICAL CENTER | Age: 52
End: 2017-05-31
Attending: NURSE PRACTITIONER
Payer: COMMERCIAL

## 2017-05-31 DIAGNOSIS — C18.9 METASTATIC COLON CANCER TO LIVER (HCC): ICD-10-CM

## 2017-05-31 DIAGNOSIS — C78.7 METASTATIC COLON CANCER TO LIVER (HCC): ICD-10-CM

## 2017-05-31 RX ORDER — OMEPRAZOLE 20 MG/1
20 CAPSULE, DELAYED RELEASE ORAL DAILY
COMMUNITY
End: 2018-06-19

## 2017-05-31 NOTE — CONSULTS
Interventional Oncology Consultation      Re: Vivian Bajwa     MRN: [id number]   : 1965    Vivian Bajwa was referred by Enrique Tate MD. She is a 51 y.o. female seen in clinic for evaluation and possible intervention of unresectable metastatic colon cancer to both hepatic lobes. She is also under the care of Jorge Harrington MD and Jose Borrego MD.    History of Present Illness:  Ms.Schlaffer Bajwa is an established patient with Yoavnny Lamb MD and was seen today in follow up after left hepatic lobe selective internal radiation therapy. She enjoyed good health until 2016. She has a history of Gilbert's disease and reports her total bilirubin has been elevated as long as she can remember. She describes an elevation of AST/ ALT above her baseline on her annual labs in 2016 and had planned to repeat them later in the year, however in October she presented to her local emergency room with severe abdominal pain and was found to have a small bowel obstruction due to tumor as well as innumerable hepatic lesions. She underwent surgical intervention that included resection and biopsy of hepatic lesions, which proved metastatic adenocarcinoma. She first underwent FOLFOX in November. She was on Avastin as well but developed high fevers after each of two infusions, therefore she is no longer on this medication. She was switched to FOLFIRI in January with infusions every two weeks. She denies any problems with chemotherapy and in fact has gained weight, from 96 to 106 pounds. She had response to the chemotherapy and was additionally referred to Dr. Lamb for Y90 SIRT of both hepatic lobes. While on chemotherapy, her total bilirubin elevated to 3.2 but returned quickly to baseline once she was off the medication. She underwent mapping on May 9 and Y90 SIRT of the left hepatic lobe on May 15. She tolerated the procedure well. She felt very good after the procedure until the  "third day, when she had significant fatigue and nausea, which has since resolved. After the mapping procedure she was evaluated in the ER for complaints of paresthesia and cold fingers in her left hand after the radial artery access, which ultimately was determined not to be related to an arterial occlusion and resolved without intervention. Today she feels well. She is left handed and her chief complaint is residual bruising in the left forearm after the radial access. She has been very cautious with moving her wrist and states that it \"just feels weird\" sometimes in the forearm but is not painful. She is back to work as an  and lives in [place]. She has a port in place. She has a history of anxiety and panic attacks and expresses a great deal of anxiety about her diagnosis and prognosis. She is worried about progression of the disease while off chemotherapy. She is taking Prilosec daily at our direction.    She is seen today for review of imaging studies and follow up after Y90 radioembolization of the LEFT hepatic lobe and to plan treatment of the RIGHT hepatic lobe.     Past Medical History   Diagnosis Date   • Palpitations    • Gilbert's disease    • GERD (gastroesophageal reflux disease)    • Vocal cord nodules    • Closed head injury 2011   • Skull fracture (CMS-HCC) 2011   • Femur fracture (CMS-HCC)    • Metastatic colon cancer to liver (CMS-HCC)    • Bowel habit changes      constipation   • PMDD (premenstrual dysphoric disorder)    • Panic disorder      takes Xanax as needed   • Cancer (CMS-HCC) 2016     colon/liver (chemo/surgery)   • Dog bite 4/15/17     left knee     Past Surgical History   Procedure Laterality Date   • Exploratory laparotomy  10/27/2016     Procedure: EXPLORATORY LAPAROTOMY;  Surgeon: Jose Borrego M.D.;  Location: SURGERY [facility];  Service:    • Colon resection  10/27/2016     Procedure: RIGHT COLON RESECTION;  Surgeon: Jose BENSON" VIPUL Borrego;  Location: SURGERY [facility];  Service:    • Liver biopsy  10/27/2016     Procedure: LIVER BIOPSY;  Surgeon: Jose Borrego M.D.;  Location: SURGERY [facility];  Service:    • Cath placement Right 11/1/2016     Procedure: CATH PLACEMENT - PORT ;  Surgeon: Jose Borrego M.D.;  Location: SURGERY SAME DAY [facility];  Service:    • Other orthopedic surgery       ORIF left femur     Social History     Social History   • Marital Status:      Spouse Name: N/A   • Number of Children: N/A   • Years of Education: N/A     Occupational History   • Not on file.     Social History Main Topics   • Smoking status: Former Smoker -- 0.25 packs/day for 20 years     Types: Cigarettes     Quit date: 10/26/2016   • Smokeless tobacco: Never Used   • Alcohol Use: Yes      Comment: 4 per week   • Drug Use: No   • Sexual Activity: Not on file     Other Topics Concern   • Not on file     Social History Narrative     Family History   Problem Relation Age of Onset   • Heart Disease Mother    • Cancer Father      esoph       Review of Systems:  Constitutional: positive for fatigue  Respiratory: negative  Musculoskeletal:positive for left upper extremity bruising, decreased sensation  Neurological: negative  Behavioral/Psych: positive for anxiety       Labs:      Ref. Range 4/29/2017 09:20 5/4/2017 07:42 5/10/2017 13:10 5/25/2017 08:05   WBC Latest Ref Range: 4.8-10.8 K/uL 2.9 (L) 3.6 (L) 7.2 5.2   RBC Latest Ref Range: 4.20-5.40 M/uL 3.69 (L) 3.95 (L) 3.64 (L) 4.29   Hemoglobin Latest Ref Range: 13.0-17.0 g/dL 12.8 (L) 13.9 12.5 14.7   Hematocrit Latest Ref Range: 39.0-50.0 % 37.3 (L) 40.1 36.9 (L) 43.4   MCV Latest Ref Range: 81.0-99.0 fL 101.1 (H) 101.5 (H) 101.4 (H) 101.2 (H)   MCH Latest Ref Range: 28.4-32.7 pg 34.7 (H) 35.2 (H) 34.3 (H) 34.3 (H)   MCHC Latest Ref Range: 33.0-37.0 g/dL 34.3 34.7 33.9 33.9   RDW Latest Ref Range: 11.5-14.5 % 14.5 14.0 51.5 (H) 12.8   Platelet Count Latest Ref  Range: 130-400 K/uL 224 193 208 216   MPV Latest Ref Range: 7.4-10.4 fL 9.9 10.1 9.7 10.3   Neutrophils-Polys Latest Ref Range: 44.00-72.00 %   57.00    Neutrophils Automated Latest Ref Range: 39.0-70.0 % 38.6 (L) 40.7  66.7   Abs Neutrophils Automated Latest Ref Range: 1.8-7.7 K/uL 1.1 (L) 1.5 (L)  3.5   Neutrophils (Absolute) Latest Ref Range: 2.00-7.15 K/uL   4.11    Lymphocytes Latest Ref Range: 22.00-41.00 %   34.90    Lymphocytes Automated Latest Ref Range: 21.0-50.0 % 47.4 43.7  20.0 (L)   Abs Lymph Automated Latest Ref Range: 1.2-4.8 K/uL 1.4 1.6  1.0 (L)   Lymphs (Absolute) Latest Ref Range: 1.00-4.80 K/uL   2.51    Monocytes Latest Ref Range: 0.00-13.40 %   6.70    Monos (Absolute) Latest Ref Range: 0.00-0.85 K/uL   0.48    Eosinophils Latest Ref Range: 0.00-6.90 %   0.80    Eosinophils Automated Latest Ref Range: 0.0-5.0 % 3.4 4.1  4.6   Eos (Absolute) Latest Ref Range: 0.00-0.51 K/uL   0.06    Basophils Latest Ref Range: 0.00-1.80 %   0.30    Basophils Automated Latest Ref Range: 0.0-3.0 % 1.0 1.1  0.8   Baso (Absolute) Latest Ref Range: 0.00-0.12 K/uL   0.02    Immature Granulocytes Latest Ref Range: 0.00-0.90 %   0.30    Immature Granulocytes (abs) Latest Ref Range: 0.00-0.11 K/uL   0.02    Nucleated RBC Latest Units: /100 WBC   0.00    NRBC (Absolute) Latest Units: K/uL   0.00    Monocytes Automated Latest Ref Range: 1.7-9.3 % 9.6 (H) 10.4 (H)  7.9   Sodium Latest Ref Range: 136-145 mmol/L 141 142 144 142   Potassium Latest Ref Range: 3.5-5.1 mmol/L 3.8 4.1 3.4 (L) 4.3   Chloride Latest Ref Range:  mmol/L 106 106 111 106   Co2 Latest Ref Range: 20-29 mmol/L 27 27 26 31 (H)   Anion Gap Latest Ref Range: 10-18 mmol/L 12 13 7.0 9 (L)   Glucose Latest Ref Range:  mg/dL 76 84 93 83   Bun Latest Ref Range: 6-18 mg/dL 8 9 7 (L) 10   Creatinine Latest Ref Range: 0.6-1.0 mg/dL 0.8 0.8 0.63 0.8   GFR If  Latest Ref Range: >60 mL/min/1.73 m 2 >60 >60 >60 >60   GFR If Non   American Latest Ref Range: >60 mL/min/1.73 m 2 >60 >60 >60 >60   Calcium Latest Ref Range: 8.5-10.1 mg/dL 9.2 9.4 9.5 9.3   AST(SGOT) Latest Ref Range: 5-37 U/L 56 (H) 57 (H) 49 (H) 91 (H)   ALT(SGPT) Latest Ref Range: 12-78 U/L 82 (H) 84 (H) 70 (H) 157 (H)   Alkaline Phosphatase Latest Ref Range:  U/L 243 (H) 249 (H) 192 (H) 276 (H)   Total Bilirubin Latest Ref Range: 0.1-1.2 mg/dL 1.4 (H) 1.4 (H) 1.3 1.2   Direct Bilirubin Latest Ref Range: 0.0-0.2 mg/dL 0.3 (H)   <0.2   Indirect Bilirubin Latest Ref Range: 0.1-0.9 mg/dL 1.1 (H)   1.0 (H)   Albumin Latest Ref Range: 3.5-5.0 g/dL 3.7 3.9 4.1 3.7   Total Protein Latest Ref Range: 6.4-8.3 g/dL 7.2 7.4 6.6 7.6   Globulin Latest Ref Range: 1.9-3.5 g/dL   2.5    A-G Ratio Latest Units: g/dL 1.1 1.1 1.6    PT Latest Ref Range: 9.3-12.4 sec  10.1 12.9 9.9   INR Unknown  0.98 0.94 0.96   APTT Latest Ref Range: 24.7-36.0 sec   39.6 (H)    Carcinoembryonic Antigen Latest Units: ng/mL 1.5        Pathology:  [facility], October 27, 2016:  FINAL DIAGNOSIS:    A. Right colon resection:         Right colon demonstrates an invasive moderate to poorly          differentiated adenocarcinoma, including focal mucinous          differentiation.         The invasive adenocarcinoma is positive for synaptophysin          predominantly within the poorly differentiated component and          focally within the surrounding glands, consistent with focal          neuroendocrine differentiation.         The invasive adenocarcinoma invades into the subserosal adipose          tissue.         The proximal, distal and mesenteric resection margins are          negative for malignancy.         There is angiolymphatic space invasion identified.         Eight of twenty-nine lymph nodes (8/29) positive for metastatic          adenocarcinoma.         There are approximately eight soft tissue tumor deposits.          Several of the tumor deposits are suggestive for former lymph          nodes  completely replaced by metastatic adenocarcinoma.         Benign appendix negative for acute appendicitis or malignancy.         Benign terminal ileum negative for malignancy.         See synoptic report and comment below.  B. Liver biopsy:         Liver demonstrates a metastatic poorly differentiated carcinoma,          morphologically consistent with metastatic poorly          differentiated colon adenocarcinoma to the liver.         The metastatic adenocarcinoma extends to the cauterized margin          of the biopsy piece.  ADDENDUM:    A request for NESTOR/ZAHIRA Panel (Extended KRAS) molecular analysis was sent  out on 01/19/2017 for patient Vivian Bajwa from Dr. Colt Tate.  The test was requested to be performed on tissue from case  [id number], (signed out on 10/31/2016).  The case report, slides, and  blocks for the cited accession were retrieved from archives.  Dr. Phan  reviewed the original pathology report, examined candidate H&E slides,  and selected the block A5 appropriate to the specifications of the  ordered molecular analysis.  The block was forwarded to [facility] where the subject molecular test was performed.  Below  lists the results of the molecular test[s] ordered.    Block A5 was sent to [facility] for NESTOR/ZAHIRA Panel; a  report was generated.  Result:  DETECTED ABNORMALITIES BRAF.  Please  see separate [facility] report for further details.  ADDENDUM:    This case is being addended to report the results of IHC studies for  mismatch repair proteins to evaluate for Galvin syndrome. All four  mismatch repair proteins are expressed in the tumor cells. These  findings make Galvin syndrome unlikely. These results are also reported  in the IHC section below.    Radiology:   [facility]  Nuclear medicine [facility] imaging May 15, 2017:  The prescribed dose was  0.6 gb ( 16.2 mCi). The drawn dose was .63 gb (17.1mCi).  Delivered dose after residual was measured at .6267 gb ( 16.92 mCi). This represents  104% of the prescribed dose and  99% of the drawn dose. Bremsstrahlung images obtained   after the Y-90 radioembolization, confirm proper delivery to the targeted region. There is no uptake outside the planned treatment area. NOTE: The patient will be followed by interventional radiology and oncology.    Y90 SIRT May 15, 2017:  1.  Technically successful Y90 radioembolization of the left hepatic lobe. The patient will followup in approximately 10 days for laboratory and clinical evaluation and 4-6 weeks for imaging evaluation.  2.  Immediately following the procedure, the patient was transported to nuclear medicine for SPECT imaging which demonstrates Bremsstrahlung emission from the left lobe of the liver. There is no definite evidence of extrahepatic deposition of   radioembolic material.    CT chest abdomen pelvis February 21, 2017:  1.  Decrease in size of multiple metastatic lesions within liver consistent with treatment response.  2.  Heterogeneous low-attenuation left lobe of the liver decreased consistent with treatment response.  3.  Thrombosed portal veins within the left lobe liver are partially recanalized.  4.  Proximal colectomy and ileocolic anastomosis. No evidence of bowel obstruction. No free fluid.  5.  Mild bilateral pelvocaliectasis similar to previous findings. Ureters are not well delineated.  6.  No pulmonary consolidation, mass, or pleural effusion.    CT abdomen pelvis December 28, 2016:  1.  Hepatomegaly with numerous hepatic metastases, as seen previously.  2.  RIGHT mid abdominal bowel anastomosis.  3.  No bowel obstruction or evidence for perforation.  4.  Increased colonic stool.  5.  Fibroid uterus.      Current Outpatient Prescriptions   Medication Sig Dispense Refill   • omeprazole (PRILOSEC) 20 MG delayed-release capsule Take 20 mg by mouth every day. Indications: Treatment to Prevent Stress  Ulcers     • oxycodone immediate-release (ROXICODONE) 5 MG Tab Take 5 mg by mouth every four hours as needed for Severe Pain.     • docusate sodium (COLACE) 100 MG Cap Take 100 mg by mouth as needed for Constipation.     • ibuprofen (MOTRIN) 200 MG Tab Take 200 mg by mouth every 6 hours as needed.     • NON SPECIFIED by Intravenous route every 14 days. Folfiri chemotherapy     • alprazolam (XANAX) 0.25 MG Tab Take 1 Tab by mouth 3 times a day as needed for Sleep. 90 Tab 3   • ondansetron (ZOFRAN ODT) 4 MG TABLET DISPERSIBLE Take 4 mg by mouth every 8 hours as needed for Nausea/Vomiting.       No current facility-administered medications for this encounter.       Allergies   Allergen Reactions   • Pcn [Penicillins]      Unknown allergy,rxn = as a kid  Tolerates cephalosporins     • Lexapro Unspecified     Heart raced   • Paxil [Paroxetine] Unspecified     Heart raced   • Zoloft Unspecified     Heart raced         Physical Exam:  General Appearance: healthy, alert, no distress, cooperative  Lungs: negative findings: normal respiratory rate and rhythm  Extremities: positive findings: mild resolving ecchymosis proximal to left upper extremity radial puncture site. Angio site well healed.  Musculoskeletal: negative findings: left wrist/ hand ROM intact  Peripheral Pulses: Capillary refill <2secs, strong peripheral pulses LUE   ECOG Performance Status 0    Impression:   1. Unresectable metastatic colon cancer to both hepatic lobes, status post Y90 SIRT to the LEFT hepatic lobe.  2. Gilbert's disease.  3. Gastroesophageal reflux disease.  4. Anxiety.  5. Panic disorder.    Plan:   Yovanny Lamb MD has reviewed Ms.Schlaffer Bajwa's history and imaging studies, examined the patient, and discussed treatment options. Ms.Schlaffer Bajwa tolerated the Y90 LEFT lobe procedure well and is eager to proceed with treatment of the RIGHT hepatic lobe. We discussed her treatment plan with Dr. Tate, who will arrange chemotherapy in the  interim until our procedure is scheduled, which is June 26. The patient will contact his office to coordinate her chemo. We will repeat labs prior to the June 26 procedure. We again reviewed the method of the procedure including angiography and embolization as well as the expected clinical course with the possibility of post-embolization syndrome nausea and pain and hospitalization. Her recovery may be more prolonged after the right lobe Y90 procedure. We explained that this procedure is palliative and not curative. We discussed the possibility of tumor recurrence and development of future tumors. We additionally discussed the risks, including bleeding and infection, damage to the arteries, reaction to any medications given during the procedure, potential side effects of contrast administration including renal damage, non-target embolization, radiation-induced ulcers or liver disease, liver failure, and death. We discussed alternatives of the procedure including surveillance with no intervention and continuing with chemotherapy alone. The patient verbalizes understanding of risks and alternatives and elects to proceed. She has requested a radial access again and expressed that she does not wish for a femoral access if possible. All questions were answered. Written pre- and post- procedure care instructions were provided. She will continue the Prilosec as directed.       FUNMILAYO Choi with Yovanny Lamb MD  Interventional Radiology   [facility]   [id number])  [place] [address]  [phone]

## 2017-06-06 ENCOUNTER — HOSPITAL ENCOUNTER (OUTPATIENT)
Facility: MEDICAL CENTER | Age: 52
End: 2017-06-06
Attending: INTERNAL MEDICINE
Payer: COMMERCIAL

## 2017-06-06 LAB
ALBUMIN SERPL BCP-MCNC: 4.3 G/DL (ref 3.2–4.9)
ALBUMIN/GLOB SERPL: 1.7 G/DL
ALP SERPL-CCNC: 206 U/L (ref 30–99)
ALT SERPL-CCNC: 60 U/L (ref 2–50)
ANION GAP SERPL CALC-SCNC: 7 MMOL/L (ref 0–11.9)
AST SERPL-CCNC: 42 U/L (ref 12–45)
BILIRUB SERPL-MCNC: 1.6 MG/DL (ref 0.1–1.5)
BUN SERPL-MCNC: 8 MG/DL (ref 8–22)
CALCIUM SERPL-MCNC: 9.8 MG/DL (ref 8.5–10.5)
CHLORIDE SERPL-SCNC: 106 MMOL/L (ref 96–112)
CO2 SERPL-SCNC: 28 MMOL/L (ref 20–33)
CREAT SERPL-MCNC: 0.58 MG/DL (ref 0.5–1.4)
GFR SERPL CREATININE-BSD FRML MDRD: >60 ML/MIN/1.73 M 2
GLOBULIN SER CALC-MCNC: 2.5 G/DL (ref 1.9–3.5)
GLUCOSE SERPL-MCNC: 69 MG/DL (ref 65–99)
POTASSIUM SERPL-SCNC: 3.9 MMOL/L (ref 3.6–5.5)
PROT SERPL-MCNC: 6.8 G/DL (ref 6–8.2)
SODIUM SERPL-SCNC: 141 MMOL/L (ref 135–145)

## 2017-06-06 PROCEDURE — 80053 COMPREHEN METABOLIC PANEL: CPT

## 2017-06-25 PROCEDURE — 700111 HCHG RX REV CODE 636 W/ 250 OVERRIDE (IP): Performed by: RADIOLOGY

## 2017-06-25 PROCEDURE — 700105 HCHG RX REV CODE 258: Performed by: RADIOLOGY

## 2017-06-25 RX ORDER — DEXAMETHASONE SODIUM PHOSPHATE 4 MG/ML
8 INJECTION, SOLUTION INTRA-ARTICULAR; INTRALESIONAL; INTRAMUSCULAR; INTRAVENOUS; SOFT TISSUE
Status: COMPLETED | OUTPATIENT
Start: 2017-06-25 | End: 2017-06-26

## 2017-06-26 ENCOUNTER — HOSPITAL ENCOUNTER (OUTPATIENT)
Facility: MEDICAL CENTER | Age: 52
End: 2017-06-26
Attending: RADIOLOGY | Admitting: RADIOLOGY
Payer: COMMERCIAL

## 2017-06-26 ENCOUNTER — APPOINTMENT (OUTPATIENT)
Dept: RADIOLOGY | Facility: MEDICAL CENTER | Age: 52
End: 2017-06-26
Attending: RADIOLOGY
Payer: COMMERCIAL

## 2017-06-26 VITALS
DIASTOLIC BLOOD PRESSURE: 55 MMHG | TEMPERATURE: 99 F | HEIGHT: 63 IN | HEART RATE: 59 BPM | OXYGEN SATURATION: 98 % | BODY MASS INDEX: 20 KG/M2 | SYSTOLIC BLOOD PRESSURE: 108 MMHG | WEIGHT: 112.88 LBS | RESPIRATION RATE: 16 BRPM

## 2017-06-26 DIAGNOSIS — C78.7 METASTATIC COLON CANCER TO LIVER (HCC): ICD-10-CM

## 2017-06-26 DIAGNOSIS — C18.9 METASTATIC COLON CANCER TO LIVER (HCC): ICD-10-CM

## 2017-06-26 PROCEDURE — 78215 LVR&SPLEEN IMG STATIC ONLY: CPT

## 2017-06-26 PROCEDURE — 37243 VASC EMBOLIZE/OCCLUDE ORGAN: CPT

## 2017-06-26 PROCEDURE — 700105 HCHG RX REV CODE 258: Performed by: RADIOLOGY

## 2017-06-26 PROCEDURE — 36247 INS CATH ABD/L-EXT ART 3RD: CPT

## 2017-06-26 PROCEDURE — 700102 HCHG RX REV CODE 250 W/ 637 OVERRIDE(OP)

## 2017-06-26 PROCEDURE — 700111 HCHG RX REV CODE 636 W/ 250 OVERRIDE (IP): Performed by: RADIOLOGY

## 2017-06-26 PROCEDURE — 700111 HCHG RX REV CODE 636 W/ 250 OVERRIDE (IP)

## 2017-06-26 PROCEDURE — 76937 US GUIDE VASCULAR ACCESS: CPT

## 2017-06-26 PROCEDURE — 99153 MOD SED SAME PHYS/QHP EA: CPT

## 2017-06-26 PROCEDURE — A9270 NON-COVERED ITEM OR SERVICE: HCPCS

## 2017-06-26 PROCEDURE — 160002 HCHG RECOVERY MINUTES (STAT)

## 2017-06-26 RX ORDER — SODIUM CHLORIDE 9 MG/ML
INJECTION, SOLUTION INTRAVENOUS CONTINUOUS
Status: DISCONTINUED | OUTPATIENT
Start: 2017-06-26 | End: 2017-06-26 | Stop reason: HOSPADM

## 2017-06-26 RX ORDER — VERAPAMIL HYDROCHLORIDE 2.5 MG/ML
INJECTION, SOLUTION INTRAVENOUS
Status: COMPLETED
Start: 2017-06-26 | End: 2017-06-26

## 2017-06-26 RX ORDER — OXYCODONE HYDROCHLORIDE 5 MG/1
TABLET ORAL
Status: COMPLETED
Start: 2017-06-26 | End: 2017-06-26

## 2017-06-26 RX ORDER — ONDANSETRON 2 MG/ML
INJECTION INTRAMUSCULAR; INTRAVENOUS
Status: COMPLETED
Start: 2017-06-26 | End: 2017-06-26

## 2017-06-26 RX ORDER — HEPARIN SODIUM,PORCINE 1000/ML
VIAL (ML) INJECTION
Status: COMPLETED
Start: 2017-06-26 | End: 2017-06-26

## 2017-06-26 RX ORDER — SODIUM CHLORIDE 9 MG/ML
500 INJECTION, SOLUTION INTRAVENOUS
Status: ACTIVE | OUTPATIENT
Start: 2017-06-26 | End: 2017-06-26

## 2017-06-26 RX ORDER — MORPHINE SULFATE 4 MG/ML
INJECTION, SOLUTION INTRAMUSCULAR; INTRAVENOUS
Status: COMPLETED
Start: 2017-06-26 | End: 2017-06-26

## 2017-06-26 RX ORDER — ONDANSETRON 2 MG/ML
4 INJECTION INTRAMUSCULAR; INTRAVENOUS EVERY 8 HOURS PRN
Status: DISCONTINUED | OUTPATIENT
Start: 2017-06-26 | End: 2017-06-26 | Stop reason: HOSPADM

## 2017-06-26 RX ORDER — MIDAZOLAM HYDROCHLORIDE 1 MG/ML
.5-2 INJECTION INTRAMUSCULAR; INTRAVENOUS PRN
Status: ACTIVE | OUTPATIENT
Start: 2017-06-26 | End: 2017-06-26

## 2017-06-26 RX ORDER — ONDANSETRON 2 MG/ML
4 INJECTION INTRAMUSCULAR; INTRAVENOUS PRN
Status: ACTIVE | OUTPATIENT
Start: 2017-06-26 | End: 2017-06-26

## 2017-06-26 RX ORDER — MIDAZOLAM HYDROCHLORIDE 1 MG/ML
INJECTION INTRAMUSCULAR; INTRAVENOUS
Status: COMPLETED
Start: 2017-06-26 | End: 2017-06-26

## 2017-06-26 RX ORDER — OXYCODONE HYDROCHLORIDE 5 MG/1
5 TABLET ORAL
Status: DISCONTINUED | OUTPATIENT
Start: 2017-06-26 | End: 2017-06-26 | Stop reason: HOSPADM

## 2017-06-26 RX ORDER — MORPHINE SULFATE 4 MG/ML
4 INJECTION, SOLUTION INTRAMUSCULAR; INTRAVENOUS
Status: DISCONTINUED | OUTPATIENT
Start: 2017-06-26 | End: 2017-06-26 | Stop reason: HOSPADM

## 2017-06-26 RX ORDER — OXYCODONE HYDROCHLORIDE 5 MG/1
10 TABLET ORAL
Status: DISCONTINUED | OUTPATIENT
Start: 2017-06-26 | End: 2017-06-26 | Stop reason: HOSPADM

## 2017-06-26 RX ADMIN — DEXAMETHASONE SODIUM PHOSPHATE 8 MG: 4 INJECTION, SOLUTION INTRAMUSCULAR; INTRAVENOUS at 08:45

## 2017-06-26 RX ADMIN — ONDANSETRON 4 MG: 2 INJECTION INTRAMUSCULAR; INTRAVENOUS at 12:00

## 2017-06-26 RX ADMIN — MORPHINE SULFATE 4 MG: 4 INJECTION, SOLUTION INTRAMUSCULAR; INTRAVENOUS at 14:30

## 2017-06-26 RX ADMIN — SODIUM CHLORIDE: 9 INJECTION, SOLUTION INTRAVENOUS at 09:00

## 2017-06-26 RX ADMIN — MIDAZOLAM HYDROCHLORIDE 1 MG: 1 INJECTION INTRAMUSCULAR; INTRAVENOUS at 10:24

## 2017-06-26 RX ADMIN — VERAPAMIL HYDROCHLORIDE 2.5 MG: 2.5 INJECTION, SOLUTION INTRAVENOUS at 10:30

## 2017-06-26 RX ADMIN — MORPHINE SULFATE 4 MG: 4 INJECTION, SOLUTION INTRAMUSCULAR; INTRAVENOUS at 13:04

## 2017-06-26 RX ADMIN — OXYCODONE HYDROCHLORIDE 5 MG: 5 TABLET ORAL at 11:50

## 2017-06-26 RX ADMIN — MIDAZOLAM HYDROCHLORIDE 1 MG: 1 INJECTION INTRAMUSCULAR; INTRAVENOUS at 10:20

## 2017-06-26 RX ADMIN — ONDANSETRON HYDROCHLORIDE 16 MG: 2 SOLUTION INTRAMUSCULAR; INTRAVENOUS at 08:50

## 2017-06-26 RX ADMIN — HEPARIN 500 UNITS: 100 SYRINGE at 15:50

## 2017-06-26 RX ADMIN — MIDAZOLAM HYDROCHLORIDE 1 MG: 1 INJECTION INTRAMUSCULAR; INTRAVENOUS at 10:42

## 2017-06-26 RX ADMIN — MIDAZOLAM 2 MG: 1 INJECTION INTRAMUSCULAR; INTRAVENOUS at 10:14

## 2017-06-26 RX ADMIN — MORPHINE SULFATE 4 MG: 4 INJECTION INTRAVENOUS at 14:30

## 2017-06-26 RX ADMIN — MORPHINE SULFATE 4 MG: 4 INJECTION INTRAVENOUS at 13:04

## 2017-06-26 RX ADMIN — CEFTRIAXONE SODIUM 1 G: 1 INJECTION, POWDER, FOR SOLUTION INTRAMUSCULAR; INTRAVENOUS at 09:20

## 2017-06-26 RX ADMIN — MIDAZOLAM HYDROCHLORIDE 2 MG: 1 INJECTION INTRAMUSCULAR; INTRAVENOUS at 10:14

## 2017-06-26 RX ADMIN — NITROGLYCERIN 100 MCG: 20 INJECTION INTRAVENOUS at 10:30

## 2017-06-26 RX ADMIN — HEPARIN SODIUM 3000 UNITS: 1000 INJECTION, SOLUTION INTRAVENOUS; SUBCUTANEOUS at 10:30

## 2017-06-26 RX ADMIN — ONDANSETRON 4 MG: 2 INJECTION INTRAMUSCULAR; INTRAVENOUS at 11:50

## 2017-06-26 RX ADMIN — FENTANYL CITRATE 50 MCG: 50 INJECTION, SOLUTION INTRAMUSCULAR; INTRAVENOUS at 10:14

## 2017-06-26 ASSESSMENT — PAIN SCALES - GENERAL
PAINLEVEL_OUTOF10: 7
PAINLEVEL_OUTOF10: 5
PAINLEVEL_OUTOF10: 5
PAINLEVEL_OUTOF10: 1
PAINLEVEL_OUTOF10: 0
PAINLEVEL_OUTOF10: 0
PAINLEVEL_OUTOF10: 8
PAINLEVEL_OUTOF10: 7
PAINLEVEL_OUTOF10: 8
PAINLEVEL_OUTOF10: 5
PAINLEVEL_OUTOF10: 10

## 2017-06-26 NOTE — PROGRESS NOTES
Pt seen post procedure with Dr. Lamb in nuclear medicine. Does not have any remaining post procedure antiemetic/ analgesic meds from prior Y90 procedure. Elder not showing pt history but had Rx from our office prior.    RX with patient:  Ondansetron 4 mg ODT, po q8hr prn post procedure n/v  #20 no RF  Oxycodone 5 mg po 1-2 q4 hr prn post procedure pain #30 no RF  Medrol dose pack take as directed on pack for post procedure inflammation disp one, no RF, start tomorrow.    Post-radial access instructions per [facility] staff.    Follow up in IR clinic in approximately 2 weeks, we will make appt. Pt to get labs drawn ~2 days prior.  Labs ordered in [facility]. Pt gets draws at [facility].

## 2017-06-26 NOTE — IP AVS SNAPSHOT
6/26/2017    Vivian Bajwa   [address] [address]  [facility] [address]    Dear Vivian:    Pending sale to [facility] wants to ensure your discharge home is safe and you or your loved ones have had all of your questions answered regarding your care after you leave the hospital.    Below is a list of resources and contact information should you have any questions regarding your hospital stay, follow-up instructions, or active medical symptoms.    Questions or Concerns Regarding… Contact   Medical Questions Related to Your Discharge  (7 days a week, 8am-5pm) Contact a Nurse Care Coordinator   [phone]   Medical Questions Not Related to Your Discharge  (24 hours a day / 7 days a week)  Contact the Nurse Health Line   [phone]    Medications or Discharge Instructions Refer to your discharge packet   or contact your [facility] Primary Care Provider   [phone]   Follow-up Appointment(s) Schedule your appointment via [employer]   or contact Scheduling [phone]   Billing Review your statement via [employer]  or contact Billing [phone]   Medical Records Review your records via [employer]   or contact Medical Records [phone]     You may receive a telephone call within two days of discharge. This call is to make certain you understand your discharge instructions and have the opportunity to have any questions answered. You can also easily access your medical information, test results and upcoming appointments via the [employer] free online health management tool. You can learn more and sign up at [employer]. For assistance setting up your [employer] account, please call [phone].    Once again, we want to ensure your discharge home is safe and that you have a clear understanding of any next steps in your care. If you have any questions or concerns, please do not hesitate to contact us, we are here for you. Thank you for choosing [facility] for your healthcare needs.    Sincerely,    Your [facility] Healthcare Team

## 2017-06-26 NOTE — IP AVS SNAPSHOT
" Home Care Instructions                                                                                                                Name:Vivian Bajwa  Medical Record Number:[id number]  CSN: [id number]    YOB: 1965   Age: 51 y.o.  Sex: female  HT:1.6 m (5' 2.99\") WT: 51.2 kg (112 lb 14 oz)          Admit Date: 6/26/2017     Discharge Date:   Today's Date: 6/26/2017  Attending Doctor:  Yovanny Lamb M.D.                  Allergies:  Pcn; Lexapro; Paxil; and Zoloft                Discharge Instructions         ACTIVITY: Rest and take it easy for the first 24 hours.  A responsible adult is recommended to remain with you during that time.  It is normal to feel sleepy.  We encourage you to not do anything that requires balance, judgment or coordination.    MILD FLU-LIKE SYMPTOMS ARE NORMAL. YOU MAY EXPERIENCE GENERALIZED MUSCLE ACHES, THROAT IRRITATION, HEADACHE AND/OR SOME NAUSEA.    FOR 24 HOURS DO NOT:  Drive, operate machinery or run household appliances.  Drink beer or alcoholic beverages.   Make important decisions or sign legal documents.    SPECIAL INSTRUCTIONS: LIMIT LEFT WRIST/ HAND MOVEMENT FOR 48 HOURS - NO PULLING, PUSHING OR LIFTING.    DIET: To avoid nausea, slowly advance diet as tolerated, avoiding spicy or greasy foods for the first day.  Add more substantial food to your diet according to your physician's instructions.  Babies can be fed formula or breast milk as soon as they are hungry.  INCREASE FLUIDS AND FIBER TO AVOID CONSTIPATION.    SURGICAL DRESSING/BATHING: REMOVE DRESSING IN 24 HOURS. MAY SHOWER BUT NO IMMERSION OR LEFT HAND IN WATER FOR 1 WEEK    FOLLOW-UP APPOINTMENT:  A follow-up appointment should be arranged with your doctor in 1 WEEK; call to schedule.    You should CALL YOUR PHYSICIAN if you develop:  Fever greater than 101 degrees F.  Pain not relieved by medication, or persistent nausea or vomiting.  Excessive bleeding (blood soaking through dressing) or " unexpected drainage from the wound.  Extreme redness or swelling around the incision site, drainage of pus or foul smelling drainage.  Inability to urinate or empty your bladder within 8 hours.  Problems with breathing or chest pain.    You should call 911 if you develop problems with breathing or chest pain.  If you are unable to contact your doctor or surgical center, you should go to the nearest emergency room or urgent care center.  Physician's telephone #: DR KAUR - [phone]    If any questions arise, call your doctor.  If your doctor is not available, please feel free to call the Surgical Center at [phone].  The Center is open Monday through Friday from 7AM to 7PM.  You can also call the HEALTH HOTLINE open 24 hours/day, 7 days/week and speak to a nurse at [phone], or toll free at [phone].    A registered nurse may call you a few days after your surgery to see how you are doing after your procedure.    MEDICATIONS: Resume taking daily medication.  Take prescribed pain medication with food.  If no medication is prescribed, you may take non-aspirin pain medication if needed.  PAIN MEDICATION CAN BE VERY CONSTIPATING.  Take a stool softener or laxative such as senokot, pericolace, or milk of magnesia if needed.    Prescription given for OXYCODONE, ONDANSTERON, MEDROL.  Last pain medication given at 1150.    If your physician has prescribed pain medication that includes Acetaminophen (Tylenol), do not take additional Acetaminophen (Tylenol) while taking the prescribed medication.    Depression / Suicide Risk    As you are discharged from this [facility] facility, it is important to learn how to keep safe from harming yourself.    Recognize the warning signs:  · Abrupt changes in personality, positive or negative- including increase in energy   · Giving away possessions  · Change in eating patterns- significant weight changes-  positive or negative  · Change in sleeping patterns- unable to  "sleep or sleeping all the time   · Unwillingness or inability to communicate  · Depression  · Unusual sadness, discouragement and loneliness  · Talk of wanting to die  · Neglect of personal appearance   · Rebelliousness- reckless behavior  · Withdrawal from people/activities they love  · Confusion- inability to concentrate     If you or a loved one observes any of these behaviors or has concerns about self-harm, here's what you can do:  · Talk about it- your feelings and reasons for harming yourself  · Remove any means that you might use to hurt yourself (examples: pills, rope, extension cords, firearm)  · Get professional help from the community (Mental Health, Substance Abuse, psychological counseling)  · Do not be alone:Call your Safe Contact- someone whom you trust who will be there for you.  · Call your local CRISIS HOTLINE 549-1059 or 232-893-0734  · Call your local Children's Mobile Crisis Response Team [place] (394) 911-6892 or www.[employer]  · Call the toll free National Suicide Prevention Hotlines   · National Suicide Prevention Lifeline 760-952-JWTQ (8739)  National Hope Line Network 800-SUICIDE (124-4754)  Post Angiogram LEFT WRIST/ HAND Care Instructions     INSTRUCTIONS  2. Examine (look and feel) the site of your incision site TODAY so you can recognize changes that should be called to your doctor (see below).  3. Avoid straining either by lifting or pulling objects for 2 days. Avoid lifting over 5 pounds.     4. If you should begin to have oozing from the catheterization site, please hold firm pressure and call your doctor's office immediately.  5. If profuse bleeding occurs from the catheterization site, hold firm pressure and call \"[phone]\" immediately for assistance.  6. Remove bandage after 24 hours.     ACTIVITY  2. Limit activity as instructed by your doctor.  3. No driving or very limited driving with frequent stops for one week.   4. If you must take a long car ride, stop every hour " and walk around the car.   5. Warm showers or baths are permitted after the bandage is removed. Avoid hot showers, baths, hot tubs, and swimming for one week.    PLEASE CALL YOUR DOCTOR IF:  1. Temperature elevation occurs.  2. Catheterization site becomes reddened or begins to drain.   3. Bruising appears to be new or not resolving.  4. The small round lump in the WRIST increases in size.  5. Any HAND numbness, aching, or discomfort (immediately).  6. Increasing discomfort in the HAND  at the insertion site.      MISCELLANEOUS INSTRUCTIONS  1. Bruising may occur as a result of heart catheterization. Some of the discoloration may travel down the leg, going from blue to green in color.  2. A small round lump under the catheterization site will remain for up to six weeks.  3. If any questions arise call your physician's office. You can also call the [facility] HOTLINE open 24 hours/day, 7 days/week and speak to a nurse at [phone], or toll free at [phone].   4. You should call 911 if you develop problems with breathing or chest pain.    FOR PROBLEMS CALL DR: DR KAUR AT: [phone]    I acknowledge receipt and understanding of these Home Care instructions.  ·        Medication List      CONTINUE taking these medications        Instructions    Morning Afternoon Evening Bedtime    alprazolam 0.25 MG Tabs   Commonly known as:  XANAX        Take 1 Tab by mouth 3 times a day as needed for Sleep.   Dose:  0.25 mg                        docusate sodium 100 MG Caps   Commonly known as:  COLACE        Take 100 mg by mouth as needed for Constipation.   Dose:  100 mg                        ibuprofen 200 MG Tabs   Commonly known as:  MOTRIN        Take 200 mg by mouth every 6 hours as needed.   Dose:  200 mg                        NON SPECIFIED        by Intravenous route every 14 days. Folfiri chemotherapy                        omeprazole 20 MG delayed-release capsule   Commonly known as:  PRILOSEC        Take 20 mg by  mouth every day. Indications: Treatment to Prevent Stress Ulcers   Dose:  20 mg                        ondansetron 4 MG Tbdp   Commonly known as:  ZOFRAN ODT        Take 4 mg by mouth every 8 hours as needed for Nausea/Vomiting.   Dose:  4 mg                        oxycodone immediate-release 5 MG Tabs   Last time this was given:  5 mg on 6/26/2017 11:50 AM   Commonly known as:  ROXICODONE        Take 5 mg by mouth every four hours as needed for Severe Pain.   Dose:  5 mg                                Medication Information     Above and/or attached are the medications your physician expects you to take upon discharge. Review all of your home medications and newly ordered medications with your doctor and/or pharmacist. Follow medication instructions as directed by your doctor and/or pharmacist. Please keep your medication list with you and share with your physician. Update the information when medications are discontinued, doses are changed, or new medications (including over-the-counter products) are added; and carry medication information at all times in the event of emergency situations.        Resources     Quit Smoking / Tobacco Use:    I understand the use of any tobacco products increases my chance of suffering from future heart disease or stroke and could cause other illnesses which may shorten my life. Quitting the use of tobacco products is the single most important thing I can do to improve my health. For further information on smoking / tobacco cessation call a Toll Free Quit Line at 1-517.920.9358 (*[facility] [place]) or 1-403.594.9593 (American Lung Association) or you can access the web based program at www.lungusa.org.    [place] Tobacco Users Help Line:  (402) 634-6822       Toll Free: 1-374.674.9170  Quit Tobacco Program [facility] (620)198-2064    Crisis Hotline:    [place] Crisis Hotline:  0-947-OJXAWWI or 1-192.266.4492    [place] Crisis Hotline:    1-517.266.6019  or 187-821-7242    Discharge Survey:   Thank you for choosing [facility]. We hope we did everything we could to make your hospital stay a pleasant one. You may be receiving a survey and we would appreciate your time and participation in answering the questions. Your input is very valuable to us in our efforts to improve our service to our patients and their families.            Signatures     My signature on this form indicates that:    1. I acknowledge receipt and understanding of these Home Care Instruction.  2. My questions regarding this information have been answered to my satisfaction.  3. I have formulated a plan with my discharge nurse to obtain my prescribed medications for home.    __________________________________      __________________________________                   Patient Signature                                 Guardian/Responsible Adult Signature      __________________________________                 __________       ________                       Nurse Signature                                               Date                 Time

## 2017-06-26 NOTE — PROGRESS NOTES
IR Progress Note:     Ytrium 90 selective internal radiation to right lobe of liver by Dr. Lamb. Pt tolerated procedure well and remained hemodynamically stable throughout. Report called to MARSHA Lynne. Pt transported to nuclear medicine and PPU by RN.    TR band to Left radial artery @ 1112  - 16 ml of air   - syringe with chart

## 2017-06-26 NOTE — DISCHARGE INSTRUCTIONS
ACTIVITY: Rest and take it easy for the first 24 hours.  A responsible adult is recommended to remain with you during that time.  It is normal to feel sleepy.  We encourage you to not do anything that requires balance, judgment or coordination.    MILD FLU-LIKE SYMPTOMS ARE NORMAL. YOU MAY EXPERIENCE GENERALIZED MUSCLE ACHES, THROAT IRRITATION, HEADACHE AND/OR SOME NAUSEA.    FOR 24 HOURS DO NOT:  Drive, operate machinery or run household appliances.  Drink beer or alcoholic beverages.   Make important decisions or sign legal documents.    SPECIAL INSTRUCTIONS: LIMIT LEFT WRIST/ HAND MOVEMENT FOR 48 HOURS - NO PULLING, PUSHING OR LIFTING.    DIET: To avoid nausea, slowly advance diet as tolerated, avoiding spicy or greasy foods for the first day.  Add more substantial food to your diet according to your physician's instructions.  Babies can be fed formula or breast milk as soon as they are hungry.  INCREASE FLUIDS AND FIBER TO AVOID CONSTIPATION.    SURGICAL DRESSING/BATHING: REMOVE DRESSING IN 24 HOURS. MAY SHOWER BUT NO IMMERSION OR LEFT HAND IN WATER FOR 1 WEEK    FOLLOW-UP APPOINTMENT:  A follow-up appointment should be arranged with your doctor in 1 WEEK; call to schedule.    You should CALL YOUR PHYSICIAN if you develop:  Fever greater than 101 degrees F.  Pain not relieved by medication, or persistent nausea or vomiting.  Excessive bleeding (blood soaking through dressing) or unexpected drainage from the wound.  Extreme redness or swelling around the incision site, drainage of pus or foul smelling drainage.  Inability to urinate or empty your bladder within 8 hours.  Problems with breathing or chest pain.    You should call 911 if you develop problems with breathing or chest pain.  If you are unable to contact your doctor or surgical center, you should go to the nearest emergency room or urgent care center.  Physician's telephone #: DR KAUR - [phone]    If any questions arise, call your doctor.  If your  doctor is not available, please feel free to call the Surgical Center at [phone].  The Center is open Monday through Friday from 7AM to 7PM.  You can also call the HEALTH HOTLINE open 24 hours/day, 7 days/week and speak to a nurse at [phone], or toll free at [phone].    A registered nurse may call you a few days after your surgery to see how you are doing after your procedure.    MEDICATIONS: Resume taking daily medication.  Take prescribed pain medication with food.  If no medication is prescribed, you may take non-aspirin pain medication if needed.  PAIN MEDICATION CAN BE VERY CONSTIPATING.  Take a stool softener or laxative such as senokot, pericolace, or milk of magnesia if needed.    Prescription given for OXYCODONE, ONDANSTERON, MEDROL.  Last pain medication given at 1150.    If your physician has prescribed pain medication that includes Acetaminophen (Tylenol), do not take additional Acetaminophen (Tylenol) while taking the prescribed medication.    Depression / Suicide Risk    As you are discharged from this [facility] facility, it is important to learn how to keep safe from harming yourself.    Recognize the warning signs:  · Abrupt changes in personality, positive or negative- including increase in energy   · Giving away possessions  · Change in eating patterns- significant weight changes-  positive or negative  · Change in sleeping patterns- unable to sleep or sleeping all the time   · Unwillingness or inability to communicate  · Depression  · Unusual sadness, discouragement and loneliness  · Talk of wanting to die  · Neglect of personal appearance   · Rebelliousness- reckless behavior  · Withdrawal from people/activities they love  · Confusion- inability to concentrate     If you or a loved one observes any of these behaviors or has concerns about self-harm, here's what you can do:  · Talk about it- your feelings and reasons for harming yourself  · Remove any means that you might  "use to hurt yourself (examples: pills, rope, extension cords, firearm)  · Get professional help from the community (Mental Health, Substance Abuse, psychological counseling)  · Do not be alone:Call your Safe Contact- someone whom you trust who will be there for you.  · Call your local CRISIS HOTLINE 919-6393 or 793-000-9960  · Call your local Children's Mobile Crisis Response Team [place] (452) 040-4123 or [employer]  · Call the toll free National Suicide Prevention Hotlines   · National Suicide Prevention Lifeline 079-850-FEXU (3433)  [place] [facility] Line Network 800-SUICIDE (097-1673)  Post Angiogram LEFT WRIST/ HAND Care Instructions     INSTRUCTIONS  2. Examine (look and feel) the site of your incision site TODAY so you can recognize changes that should be called to your doctor (see below).  3. Avoid straining either by lifting or pulling objects for 2 days. Avoid lifting over 5 pounds.     4. If you should begin to have oozing from the catheterization site, please hold firm pressure and call your doctor's office immediately.  5. If profuse bleeding occurs from the catheterization site, hold firm pressure and call \"730\" immediately for assistance.  6. Remove bandage after 24 hours.     ACTIVITY  2. Limit activity as instructed by your doctor.  3. No driving or very limited driving with frequent stops for one week.   4. If you must take a long car ride, stop every hour and walk around the car.   5. Warm showers or baths are permitted after the bandage is removed. Avoid hot showers, baths, hot tubs, and swimming for one week.    PLEASE CALL YOUR DOCTOR IF:  1. Temperature elevation occurs.  2. Catheterization site becomes reddened or begins to drain.   3. Bruising appears to be new or not resolving.  4. The small round lump in the WRIST increases in size.  5. Any HAND numbness, aching, or discomfort (immediately).  6. Increasing discomfort in the HAND  at the insertion site.      MISCELLANEOUS " INSTRUCTIONS  1. Bruising may occur as a result of heart catheterization. Some of the discoloration may travel down the leg, going from blue to green in color.  2. A small round lump under the catheterization site will remain for up to six weeks.  3. If any questions arise call your physician's office. You can also call the HEALTH HOTLINE open 24 hours/day, 7 days/week and speak to a nurse at [phone], or toll free at [phone].   4. You should call 911 if you develop problems with breathing or chest pain.    FOR PROBLEMS CALL DR: DR KAUR AT: [phone]    I acknowledge receipt and understanding of these Home Care instructions.  ·

## 2017-06-26 NOTE — OR SURGEON
Immediate Post- Operative Note        PostOp Diagnosis: mCRC.       Procedure(s): R lobe SIRT.       Estimated Blood Loss: Less than 5 ml        Complications: None            6/26/2017    1115 AM     Yovanny Lamb

## 2017-06-26 NOTE — OR NURSING
1142 Received to PPU #1, awake.With TR band to Left wrist inflated, distal fingers are warm, moving well and has good pulse ox waveform but patient complained of pain- air present was 16 ml. Released 3 ml , no bleeding. Also complained of severe RUQ pain and mild nausea, medicated.  1157 Still with lots of discomfort- encouraged deep breathing and relaxation. Nausea improved, given sprite per request.   1215 Released more air off TR band, no bleeding.  1230 A little more relaxed and less restless . Released air off TR band- no bleeding.  1245 Complained of still with lots of pain though occuring intermittently, medicated with morphine.  1312 Pain down to tolerable level. Patient dozing on and off.  1342 Resting, no signs of discomfort.  1400 Removed last air off TR band, no bleeding.  1420 TR band dc'd-, no bleeding, no hematoma. Complained again of severe pain. Assisted up to the BR- voided w/o difficulties Then medicated with morphine again.  1500 Resting in bed, no signs of discomfort noted.   1522 Awake,remarked of feeling better, pain down tolerable level. Verbalized readiness to go home but requested to talk to Dr Lamb first.   1526 Talked to Dr Lamb via phone and told RN he will be by to talk to patient.  1530 Dr Lamb here, talked to patient and spouse.  1550 Prepared for discharge .Instructions given to patient and spouse, verbalized understanding.  1555 Dc'd home in stable condition with spouse, with all belongings via wheelchair by CNA.

## 2017-06-27 ENCOUNTER — TELEPHONE (OUTPATIENT)
Dept: RADIOLOGY | Facility: MEDICAL CENTER | Age: 52
End: 2017-06-27

## 2017-06-27 NOTE — TELEPHONE ENCOUNTER
Patient called by Dr. Lamb in follow up after Y90 procedure 6/26. Pt stayed in [place] overnight. States she has increased intensity of abdominal pain compared to the last procedure. Has rx for oxycodone 5 mg and has been taking it in addition to Medrol dose pack. Discussion between Dr. Lamb and patient determined that she should stop taking oxycodone 5 mg tablets and start oxycodone 10 mg tablets one every 6 hours as needed (#30, no RF).  Rx will be provided to patient to take instead for more severe post procedure pain not anticipated based on the patient's prior Y90 recovery. Dr. Lamb discussed SE including oversedation. Rx left for  at [facility] desk.

## 2017-07-12 ENCOUNTER — HOSPITAL ENCOUNTER (OUTPATIENT)
Dept: RADIOLOGY | Facility: MEDICAL CENTER | Age: 52
End: 2017-07-12
Attending: NURSE PRACTITIONER

## 2017-07-12 DIAGNOSIS — C18.9 METASTATIC COLON CANCER TO LIVER (HCC): ICD-10-CM

## 2017-07-12 DIAGNOSIS — C78.7 METASTATIC COLON CANCER TO LIVER (HCC): ICD-10-CM

## 2017-07-12 NOTE — CONSULTS
Interventional Radiology Follow Up     Re: Vivian Bajwa     MRN: [id number]   : 1965    Vivian Bajwa was seen today in follow up after hepatic radioembolization performed by Yovanny Lamb MD at [facility] on May 15, 2017 (left lobe) and 2017 (right lobe).  She was referred to our service by Enrique Tate MD and is also under the care of Jorge Harrington MD.    Ms.Schlaffer Bajwa is an established patient with Yovanny Lamb MD and was seen today in follow up after right hepatic lobe selective internal radiation therapy. She enjoyed good health until 2016. She has a history of Gilbert's disease and reports her total bilirubin has been elevated as long as she can remember. She describes an elevation of AST/ ALT above her baseline on her annual labs in 2016 and had planned to repeat them later in the year, however in October she presented to her local emergency room with severe abdominal pain and was found to have a small bowel obstruction due to tumor as well as innumerable hepatic lesions. She underwent surgical intervention that included resection and biopsy of hepatic lesions, which proved metastatic adenocarcinoma. She first underwent FOLFOX in November. She was on Avastin as well but developed high fevers after each of two infusions, therefore she has not been on this medication. She was switched to FOLFIRI in January with infusions every two weeks, which she tolerates well. She was referred to Dr. Lamb for Y90 SIRT of both hepatic lobes. She underwent mapping on May 9 and Y90 SIRT of the left hepatic lobe on May 15, which she tolerated well. She then underwent a right lobe radioembolization on . The patient's clinical course was unremarkable. She was discharged from [facility] on the same day. She contacted us the next day with complaints of abdominal pain worse than after he first Y90 procedure. We provided a second prescription for oxycodone which she  states she never picked up to fill as she was worried about side effects after our discussion.    She is seen today to review labs and discuss follow up. Today she feels well but has less energy than usual. She is back to work. Her left radial access site is well healed without swelling, discharge or redness. She has a port in place and is back on her usual chemotherapy today. She complains of mild nausea relieved with ondansetron. She has epigastric area pain after this procedure that she did not have before. It is worse in the morning at 4-7/10 and does not change based on eating or bowel movements. It is better with ibuprofen 200 mg 2 tablets in the morning. She is taking one oxycodone 5 mg at night for the pain but it does not work as well as the ibuprofen. She denies dyspepsia or any stomach pain with the ibuprofen. She reports good appetite and denies weight loss. She denies jaundice, abdominal swelling, fevers, chills, vomiting, or bloody stools. She complains of mild ankle swelling that is usual for her to have while on chemotherapy. She complains of constipation and forgot to take her stool softener today, which usually helps. She also complains of breast tenderness for the past couple of weeks. She has a history of anxiety and panic attacks and expresses a great deal of anxiety about her diagnosis and prognosis. She is very worried about progression of the disease. She is taking Prilosec daily at our direction.      Ref. Range 7/7/2017 07:45 7/7/2017 07:45   WBC Latest Ref Range: 4.8-10.8 K/uL 6.6    RBC Latest Ref Range: 4.20-5.40 M/uL 4.18 (L)    Hemoglobin Latest Ref Range: 13.0-17.0 g/dL 14.0    Hematocrit Latest Ref Range: 39.0-50.0 % 41.8    MCV Latest Ref Range: 81.0-99.0 fL 100.0 (H)    MCH Latest Ref Range: 28.4-32.7 pg 33.5 (H)    MCHC Latest Ref Range: 33.0-37.0 g/dL 33.5    RDW Latest Ref Range: 11.5-14.5 % 12.6    Platelet Count Latest Ref Range: 130-400 K/uL 216    MPV Latest Ref Range:  7.4-10.4 fL 10.6 (H)    Neutrophils Automated Latest Ref Range: 39.0-70.0 % 76.9 (H)    Abs Neutrophils Automated Latest Ref Range: 1.8-7.7 K/uL 5.0    Lymphocytes Automated Latest Ref Range: 21.0-50.0 % 11.8 (L)    Abs Lymph Automated Latest Ref Range: 1.2-4.8 K/uL 0.8 (L)    Monos (Absolute) Latest Ref Range: 0.2-0.9 K/uL 0.6    Eosinophils Automated Latest Ref Range: 0.0-5.0 % 2.3    Basophils Automated Latest Ref Range: 0.0-3.0 % 0.5    Monocytes Automated Latest Ref Range: 1.7-9.3 % 8.5    Sodium Latest Ref Range: 136-145 mmol/L  144   Potassium Latest Ref Range: 3.5-5.1 mmol/L  4.4   Chloride Latest Ref Range:  mmol/L  106   Co2 Latest Ref Range: 20-29 mmol/L  26   Anion Gap Latest Ref Range: 10-18 mmol/L  16   Glucose Latest Ref Range:  mg/dL  80   Bun Latest Ref Range: 6-18 mg/dL  7   Creatinine Latest Ref Range: 0.6-1.0 mg/dL  0.7   GFR If  Latest Ref Range: >60 mL/min/1.73 m 2  >60   GFR If Non  Latest Ref Range: >60 mL/min/1.73 m 2  >60   Calcium Latest Ref Range: 8.5-10.1 mg/dL  8.9   AST(SGOT) Latest Ref Range: 5-37 U/L 43 (H) 44 (H)   ALT(SGPT) Latest Ref Range: 12-78 U/L 54 54   Alkaline Phosphatase Latest Ref Range:  U/L 161 (H) 159 (H)   Total Bilirubin Latest Ref Range: 0.1-1.2 mg/dL 0.9 0.8   Direct Bilirubin Latest Ref Range: 0.0-0.2 mg/dL <0.2    Indirect Bilirubin Latest Ref Range: 0.1-0.9 mg/dL 0.7    Albumin Latest Ref Range: 3.5-5.0 g/dL 3.6 3.6   Total Protein Latest Ref Range: 6.4-8.3 g/dL 6.9 7.0   A-G Ratio Unknown  1.1   PT Latest Ref Range: 9.3-12.4 sec 9.8    INR Unknown 0.93    Carcinoembryonic Antigen Latest Units: ng/mL 1.6      Yovanny Lamb MD has reviewed Ms.Schlaffer Bajwa's history and imaging studies. We discussed the method of the procedure at length and reviewed imaging studies. All questions were answered. We discussed her abdominal pain that is better relieved with NSAIDs and advised her that she may take ibuprofen more  frequently than once in the morning if it helps her pain and she tolerates it well. The oxycodone is likely the cause of her constipation since the procedure and should improve with her usual over the counter regimen and cessation of the narcotic medication as she transitions to NSAIDs. We have contacted her medical oncology office in regards to coordinating her follow up study. We will obtain a hepatic mass abdominal CT in late August but she may be due for additional imaging for her oncologist as well and these could be combined with our scan.      FUNMILAYO Choi with Yovanny Lamb MD  Interventional Oncology Service   [id number])  [place] [address]  [phone]

## 2017-07-25 ENCOUNTER — HOSPITAL ENCOUNTER (OUTPATIENT)
Facility: MEDICAL CENTER | Age: 52
End: 2017-07-25
Attending: INTERNAL MEDICINE
Payer: COMMERCIAL

## 2017-07-25 LAB
BASOPHILS # BLD AUTO: 0.6 % (ref 0–1.8)
BASOPHILS # BLD: 0.02 K/UL (ref 0–0.12)
EOSINOPHIL # BLD AUTO: 0.1 K/UL (ref 0–0.51)
EOSINOPHIL NFR BLD: 2.8 % (ref 0–6.9)
ERYTHROCYTE [DISTWIDTH] IN BLOOD BY AUTOMATED COUNT: 51.3 FL (ref 35.9–50)
HCT VFR BLD AUTO: 38.8 % (ref 37–47)
HGB BLD-MCNC: 12.3 G/DL (ref 12–16)
IMM GRANULOCYTES # BLD AUTO: 0.01 K/UL (ref 0–0.11)
IMM GRANULOCYTES NFR BLD AUTO: 0.3 % (ref 0–0.9)
LYMPHOCYTES # BLD AUTO: 0.69 K/UL (ref 1–4.8)
LYMPHOCYTES NFR BLD: 19.3 % (ref 22–41)
MCH RBC QN AUTO: 32.7 PG (ref 27–33)
MCHC RBC AUTO-ENTMCNC: 31.7 G/DL (ref 33.6–35)
MCV RBC AUTO: 103.2 FL (ref 81.4–97.8)
MONOCYTES # BLD AUTO: 0.27 K/UL (ref 0–0.85)
MONOCYTES NFR BLD AUTO: 7.5 % (ref 0–13.4)
NEUTROPHILS # BLD AUTO: 2.49 K/UL (ref 2–7.15)
NEUTROPHILS NFR BLD: 69.5 % (ref 44–72)
NRBC # BLD AUTO: 0 K/UL
NRBC BLD AUTO-RTO: 0 /100 WBC
PLATELET # BLD AUTO: 164 K/UL (ref 164–446)
PMV BLD AUTO: 10.9 FL (ref 9–12.9)
RBC # BLD AUTO: 3.76 M/UL (ref 4.2–5.4)
WBC # BLD AUTO: 3.6 K/UL (ref 4.8–10.8)

## 2017-07-25 PROCEDURE — 85025 COMPLETE CBC W/AUTO DIFF WBC: CPT

## 2017-08-05 ENCOUNTER — HOSPITAL ENCOUNTER (OUTPATIENT)
Dept: RADIOLOGY | Facility: MEDICAL CENTER | Age: 52
End: 2017-08-05
Attending: INTERNAL MEDICINE
Payer: COMMERCIAL

## 2017-08-05 DIAGNOSIS — C18.9 MALIGNANT NEOPLASM OF COLON, UNSPECIFIED PART OF COLON (HCC): ICD-10-CM

## 2017-08-05 PROCEDURE — 700117 HCHG RX CONTRAST REV CODE 255: Performed by: INTERNAL MEDICINE

## 2017-08-05 PROCEDURE — 71260 CT THORAX DX C+: CPT

## 2017-08-05 RX ADMIN — IOHEXOL 90 ML: 350 INJECTION, SOLUTION INTRAVENOUS at 10:23

## 2017-09-30 ENCOUNTER — HOSPITAL ENCOUNTER (OUTPATIENT)
Dept: RADIOLOGY | Facility: MEDICAL CENTER | Age: 52
End: 2017-09-30
Attending: INTERNAL MEDICINE
Payer: COMMERCIAL

## 2017-09-30 DIAGNOSIS — C18.9 MALIGNANT NEOPLASM OF COLON, UNSPECIFIED PART OF COLON (HCC): ICD-10-CM

## 2017-09-30 PROCEDURE — 700117 HCHG RX CONTRAST REV CODE 255: Performed by: INTERNAL MEDICINE

## 2017-09-30 PROCEDURE — 71260 CT THORAX DX C+: CPT

## 2017-09-30 RX ADMIN — IOHEXOL 100 ML: 350 INJECTION, SOLUTION INTRAVENOUS at 11:23

## 2017-10-10 ENCOUNTER — HOSPITAL ENCOUNTER (EMERGENCY)
Facility: MEDICAL CENTER | Age: 52
End: 2017-10-10
Attending: EMERGENCY MEDICINE
Payer: COMMERCIAL

## 2017-10-10 VITALS
TEMPERATURE: 98.7 F | WEIGHT: 117.5 LBS | RESPIRATION RATE: 18 BRPM | OXYGEN SATURATION: 98 % | SYSTOLIC BLOOD PRESSURE: 156 MMHG | BODY MASS INDEX: 20.82 KG/M2 | HEART RATE: 58 BPM | HEIGHT: 63 IN | DIASTOLIC BLOOD PRESSURE: 107 MMHG

## 2017-10-10 DIAGNOSIS — F41.8 SITUATIONAL ANXIETY: ICD-10-CM

## 2017-10-10 DIAGNOSIS — T45.1X5A CHEMOTHERAPY ADVERSE REACTION, INITIAL ENCOUNTER: ICD-10-CM

## 2017-10-10 LAB
ALBUMIN SERPL BCP-MCNC: 3.5 G/DL (ref 3.2–4.9)
ALBUMIN/GLOB SERPL: 1.3 G/DL
ALP SERPL-CCNC: 163 U/L (ref 30–99)
ALT SERPL-CCNC: 65 U/L (ref 2–50)
ANION GAP SERPL CALC-SCNC: 8 MMOL/L (ref 0–11.9)
AST SERPL-CCNC: 50 U/L (ref 12–45)
BASOPHILS # BLD AUTO: 0.3 % (ref 0–1.8)
BASOPHILS # BLD: 0.01 K/UL (ref 0–0.12)
BILIRUB SERPL-MCNC: 2.1 MG/DL (ref 0.1–1.5)
BUN SERPL-MCNC: 6 MG/DL (ref 8–22)
CALCIUM SERPL-MCNC: 8.9 MG/DL (ref 8.4–10.2)
CHLORIDE SERPL-SCNC: 106 MMOL/L (ref 96–112)
CO2 SERPL-SCNC: 24 MMOL/L (ref 20–33)
CREAT SERPL-MCNC: 0.82 MG/DL (ref 0.5–1.4)
EOSINOPHIL # BLD AUTO: 0 K/UL (ref 0–0.51)
EOSINOPHIL NFR BLD: 0 % (ref 0–6.9)
ERYTHROCYTE [DISTWIDTH] IN BLOOD BY AUTOMATED COUNT: 45.3 FL (ref 35.9–50)
GFR SERPL CREATININE-BSD FRML MDRD: >60 ML/MIN/1.73 M 2
GLOBULIN SER CALC-MCNC: 2.7 G/DL (ref 1.9–3.5)
GLUCOSE SERPL-MCNC: 152 MG/DL (ref 65–99)
HCT VFR BLD AUTO: 38.8 % (ref 37–47)
HGB BLD-MCNC: 13.3 G/DL (ref 12–16)
IMM GRANULOCYTES # BLD AUTO: 0.02 K/UL (ref 0–0.11)
IMM GRANULOCYTES NFR BLD AUTO: 0.6 % (ref 0–0.9)
LYMPHOCYTES # BLD AUTO: 0.34 K/UL (ref 1–4.8)
LYMPHOCYTES NFR BLD: 11 % (ref 22–41)
MCH RBC QN AUTO: 33.4 PG (ref 27–33)
MCHC RBC AUTO-ENTMCNC: 34.3 G/DL (ref 33.6–35)
MCV RBC AUTO: 97.5 FL (ref 81.4–97.8)
MONOCYTES # BLD AUTO: 0.05 K/UL (ref 0–0.85)
MONOCYTES NFR BLD AUTO: 1.6 % (ref 0–13.4)
NEUTROPHILS # BLD AUTO: 2.67 K/UL (ref 2–7.15)
NEUTROPHILS NFR BLD: 86.5 % (ref 44–72)
NRBC # BLD AUTO: 0 K/UL
NRBC BLD AUTO-RTO: 0 /100 WBC
PLATELET # BLD AUTO: 198 K/UL (ref 164–446)
PMV BLD AUTO: 9.9 FL (ref 9–12.9)
POTASSIUM SERPL-SCNC: 3.6 MMOL/L (ref 3.6–5.5)
PROT SERPL-MCNC: 6.2 G/DL (ref 6–8.2)
RBC # BLD AUTO: 3.98 M/UL (ref 4.2–5.4)
SODIUM SERPL-SCNC: 138 MMOL/L (ref 135–145)
WBC # BLD AUTO: 3.1 K/UL (ref 4.8–10.8)

## 2017-10-10 PROCEDURE — 80053 COMPREHEN METABOLIC PANEL: CPT

## 2017-10-10 PROCEDURE — 99283 EMERGENCY DEPT VISIT LOW MDM: CPT

## 2017-10-10 PROCEDURE — 85025 COMPLETE CBC W/AUTO DIFF WBC: CPT

## 2017-10-10 PROCEDURE — 36415 COLL VENOUS BLD VENIPUNCTURE: CPT

## 2017-10-10 RX ORDER — KETOROLAC TROMETHAMINE 30 MG/ML
30 INJECTION, SOLUTION INTRAMUSCULAR; INTRAVENOUS ONCE
Status: DISCONTINUED | OUTPATIENT
Start: 2017-10-10 | End: 2017-10-10 | Stop reason: HOSPADM

## 2017-10-10 RX ORDER — LORAZEPAM 2 MG/ML
0.5 INJECTION INTRAMUSCULAR ONCE
Status: DISCONTINUED | OUTPATIENT
Start: 2017-10-10 | End: 2017-10-10 | Stop reason: HOSPADM

## 2017-10-10 RX ORDER — SODIUM CHLORIDE 9 MG/ML
1000 INJECTION, SOLUTION INTRAVENOUS ONCE
Status: DISCONTINUED | OUTPATIENT
Start: 2017-10-10 | End: 2017-10-10 | Stop reason: HOSPADM

## 2017-10-10 ASSESSMENT — PAIN SCALES - GENERAL: PAINLEVEL_OUTOF10: 4

## 2017-10-11 ENCOUNTER — PATIENT OUTREACH (OUTPATIENT)
Dept: HEALTH INFORMATION MANAGEMENT | Facility: OTHER | Age: 52
End: 2017-10-11

## 2017-10-11 NOTE — ED NOTES
Blood sent to lab per ERP. IV established. Patient stated did not need ativan and questioned need for IV fluids. Patient hopefully asking if she will be discharged.

## 2017-10-11 NOTE — PROGRESS NOTES
10/11/2017 1013 - Discharge Outreach attempt - Patient answered, said she was driving and then hung up. Letter sent.

## 2017-10-11 NOTE — DISCHARGE INSTRUCTIONS
Your laboratory tests are unremarkable, and not changed from any of your previous values.  This is very reassuring.  Your symptoms may very well be from chemotherapy, since many uncomfortable reactions are possible. It is difficult to say for certain whether your sensation of cold feet is directly related, but your physical exam and vital signs are normal and very reassuring. Please schedule follow-up with her oncologist and primary care doctor.     Drug Toxicity  Drug toxicity refers to harmful and unwanted (adverse) effects of a drug in your body. Drug toxicity often results from taking too much of a drug (overdose) by accident or on purpose. With some drugs, there is only a small difference between the dose that is needed to treat your condition and a dose that is harmful (narrow therapeutic range). However, any drug can be toxic at high doses, and even normal doses of certain drugs can be toxic for some people. These include over-the-counter (OTC) medicines.  Drug toxicity can happen suddenly when you first start taking a drug or when you suddenly take too much of a drug (acute toxicity). It can also happen as a result of taking a drug for a long period of time (chronic toxicity). The effects of drug toxicity can be mild, dangerous, or even deadly.  CAUSES  Many things can cause drug toxicity. Common causes of acute toxicity include a drug overdose or an allergic reaction to a drug.  Most drugs are broken down (metabolized) by your liver and eliminated (excreted) by your kidneys. Chronic drug toxicity can result from changes in the way that your body metabolizes a drug. This can happen, for example, if you weigh less than you did when you started taking a drug but you keep taking the same dose that you took at the heavier weight.  RISK FACTORS  You may have a higher risk for drug toxicity if you:  · Are under 18 years of age or over 65 years of age.  · Have liver disease, kidney disease, or another medical  condition.  · Are taking more than one drug.  · Are pregnant.  · Are allergic to certain drugs.  · Have genes that cause you to be more affected by (susceptible to) certain drugs.  · Take a drug that has a narrow therapeutic range.  Certain types of drugs are more likely than others to cause toxicity. Many drugs have a narrow therapeutic range, including:  · Blood thinners.  · Heart medicines.  · Diabetes medicines.  · Medicines to prevent or stop seizures.  · Theophylline for asthma.  · Lithium for bipolar disorder.  SYMPTOMS  Signs and symptoms of drug toxicity depend on the drug and the amount that was taken. They may start suddenly or develop gradually over time.  DIAGNOSIS  Drug toxicity may be diagnosed based on your symptoms. Some drugs have known side effects that suggest toxicity. It is important that you tell health care provider about all of the drugs that you are taking and whether you have ever had a reaction to a drug.  Your health care provider will do a physical exam. You may have tests to check for drug toxicity, including:  · Blood tests to measure the amount of the drug in your blood or to check for signs of kidney or liver damage.  · Urine tests.  · Other tests to check for organ damage.  TREATMENT  Treatment may include:  · Stopping the drug.  · Lowering the dose of the drug.  · Switching to a different drug.  You may also need treatment to stop or reverse the effects of the toxicity. These treatments depend on the drug that caused the toxicity, how severe the toxicity is, and which parts of your body are affected.  HOME CARE INSTRUCTIONS  · Take medicines only as directed by your health care provider. Always ask your health care provider to discuss the possible side effects of any new drug that you start taking.  · Keep a list of all of the drugs that you take, including over-the-counter medicines. Bring this list with you to all of your medical visits.  · Read the drug inserts that come with  your medicines.  · Keep all follow-up visits as directed by your health care provider. This is important.  SEEK MEDICAL CARE IF:  · Your symptoms return.  · You develop any new signs or symptoms when you are taking medicines.  · You notice any signs that indicate that you are taking too much of your medicine, based on what your health care provider told you to watch for.  SEEK IMMEDIATE MEDICAL CARE IF:  · You have chest pain.  · You have difficulty breathing.  · You have a loss of consciousness.     This information is not intended to replace advice given to you by your health care provider. Make sure you discuss any questions you have with your health care provider.     Document Released: 12/18/2006 Document Revised: 05/03/2016 Document Reviewed: 12/23/2015  [employer] Interactive Patient Education ©2016 [employer]

## 2017-10-11 NOTE — ED PROVIDER NOTES
"ED Provider Note    Scribed for Guanakito Peterson M.D. by Guanakito Peterson. 10/10/2017,  8:10 PM.    CHIEF COMPLAINT  Chief Complaint   Patient presents with   • Chills     Pt reports being cold, shaking since aprox 1600 today. Tremulous in triage. Recieved chemo this afternoon.    • Nausea     When chemo started.    • Head Ache      tingly\" HA or \" a few hours\" off and on.    • Cancer     Pt currently undergoing chemo. Denies neutropenia.        HPI  Vivian Bajwa is a 52 y.o. female who presents to the Emergency DepartmentBecause of feeling that her feet were cold bilaterally. She is undergoing home chemotherapy, for the 1st time in the past 3 weeks. She uses a home infusion pump, and after a couple hours of treatment, noted cold bilateral feet, and was very afraid that this was a medication side effect. She was noted to be tremulous in triage, and had a mild presenting tachycardia. At the bedside, her tachycardia had resolved, and she is not tremulous. She noted some nausea but no vomiting earlier when the chemotherapy started. She also noted a headache, which has gone now, earlier today which was described as \"tingly,\", and was present intermittently for a few hours. She said she's had multiple side effects from chemotherapy, but has never had cold feet before, and became frightened that this was something new or serious. She has had no trauma, fevers, vomiting, chest pain or shortness of breath.    REVIEW OF SYSTEMS  See HPI for further details. All other systems are negative.     PAST MEDICAL HISTORY   has a past medical history of Bowel habit changes; Cancer (CMS-Former[facility]) (2016); Closed head injury (2011); Dog bite (4/15/17); Femur fracture (CMS-HCC); GERD (gastroesophageal reflux disease); Gilbert's disease; Metastatic colon cancer to liver (CMS-HCC); Palpitations; Panic disorder; PMDD (premenstrual dysphoric disorder); Skull fracture (CMS-HCC) (2011); and Vocal cord nodules.    SOCIAL " "HISTORY  Social History     Social History Main Topics   • Smoking status: Former Smoker     Packs/day: 0.25     Years: 20.00     Types: Cigarettes     Quit date: 10/26/2016   • Smokeless tobacco: Never Used   • Alcohol use Yes      Comment: 4 per week   • Drug use: No   • Sexual activity: Not on file     History   Drug Use No       SURGICAL HISTORY   has a past surgical history that includes exploratory laparotomy (10/27/2016); colon resection (10/27/2016); liver biopsy (10/27/2016); cath placement (Right, 11/1/2016); and other orthopedic surgery.    CURRENT MEDICATIONS  Home Medications    **Home medications have not yet been reviewed for this encounter**         ALLERGIES  Allergies   Allergen Reactions   • Pcn [Penicillins]      Unknown allergy,rxn = as a kid  Tolerates cephalosporins     • Lexapro Unspecified     Heart raced   • Paxil [Paroxetine] Unspecified     Heart raced   • Zoloft Unspecified     Heart raced         PHYSICAL EXAM  VITAL SIGNS: /107   Pulse (!) 58   Temp 37.1 °C (98.7 °F)   Resp 18   Ht 1.6 m (5' 3\")   Wt 53.3 kg (117 lb 8.1 oz)   SpO2 98%   BMI 20.82 kg/m²   Pulse ox interpretation: I interpret this pulse ox as normal.  Constitutional: Alert in no apparent distress. Anxious.  HENT: No signs of trauma, Bilateral external ears normal, Nose normal.   Eyes: Conjunctiva normal, Non-icteric.   Neck: Normal range of motion, Supple, No stridor.   Lymphatic: No lymphadenopathy noted.   Cardiovascular: Regular rate and rhythm, no murmurs.   Thorax & Lungs: Normal breath sounds, No respiratory distress, No wheezing, No chest tenderness.   Abdomen: Bowel sounds normal, Soft, No tenderness, No masses, No pulsatile masses. No peritoneal signs.  Skin: Warm, Dry, No erythema, No rash.   Extremities: Intact distal pulses, both feet are warm, with brisk capillary refill, No edema, No cyanosis.  Musculoskeletal: Good range of motion in all major joints. No or major deformities noted. "   Neurologic: Alert , Normal motor function, Normal sensory function, No focal deficits noted.   Psychiatric: Affect anxious, Judgment normal, Mood normal.     DIAGNOSTIC STUDIES / PROCEDURES      LABS  Labs Reviewed   CBC WITH DIFFERENTIAL - Abnormal; Notable for the following:        Result Value    WBC 3.1 (*)     RBC 3.98 (*)     MCH 33.4 (*)     Neutrophils-Polys 86.50 (*)     Lymphocytes 11.00 (*)     Lymphs (Absolute) 0.34 (*)     All other components within normal limits   COMP METABOLIC PANEL - Abnormal; Notable for the following:     Glucose 152 (*)     Bun 6 (*)     AST(SGOT) 50 (*)     ALT(SGPT) 65 (*)     Alkaline Phosphatase 163 (*)     Total Bilirubin 2.1 (*)     All other components within normal limits   ESTIMATED GFR     All labs reviewed by me.    RADIOLOGY  No orders to display     The radiologist's interpretation of all radiological studies have been reviewed by me.    COURSE & MEDICAL DECISION MAKING  Nursing notes, VS, PMSFHx reviewed in chart.     8:10 PM Patient seen and examined at bedside. Differential diagnosis includes but is not limited to chemotherapy side effect, anemia, electrolyte abnormality, anxiety. Ordered for basic laboratory screening to evaluate.     This patient had unremarkable laboratory tests, within the limits of her known metastases to the liver. Her laboratory abnormalities are consistent with her previous results, and there are no signs of laboratory abnormalities that would explain her cold feet. She does not have any evidence of neutropenia, despite a low white cell count. She is relieved by her normal workup, feels less anxious, and is happy to be discharged. I recommended that she call to schedule follow-up with her oncologist and primary care doctor. She knows she can return to the emergency department for severe symptoms that can't be managed by her outpatient physicians.     The patient will return for new or worsening symptoms and is stable at the time of  discharge.    The patient is referred to a primary physician for blood pressure management, diabetic screening, and for all other preventative health concerns.    DISPOSITION:  Patient will be discharged home in stable condition.    FOLLOW UP:  Jorge Harrington M.D.  [address] [place] [address] [id number]  [facility] [id number] [phone]    Call in 1 day        OUTPATIENT MEDICATIONS:  Discharge Medication List as of 10/10/2017  9:39 PM            FINAL IMPRESSION  1. Chemotherapy adverse reaction, initial encounter    2. Situational anxiety

## 2017-10-11 NOTE — ED NOTES
Left chest port accessed prior to arrival. States chemo on pump started today. States will run through Thursday. States was recommended to go to ER by on call MD. C/o cold feet and hands.

## 2017-10-11 NOTE — LETTER
Vivian Bajwa  [address] [address]  [place] [address]    October 11, 2017      Dear Vivian Bajwa,    [facility] wants to ensure your discharge home is safe and you or your loved ones have had all of your questions answered regarding your care after you leave the hospital.    Our discharge team was unsuccessful in our attempts to contact you telephonically and we wanted to be sure that you had a list of resources and contact information should you have any questions regarding your hospital stay, follow-up instructions, or active medical symptoms.    Questions or Concerns Regarding… Contact   Medical Questions Related to Your Discharge  (7 days a week, 8am-5pm) Contact a Nurse Care Coordinator   [phone]   Medical Questions Not Related to Your Discharge  (24 hours a day / 7 days a week)  Contact the Nurse Health Line   [phone]    Medications or Discharge Instructions Refer to your discharge packet   or contact your -[phone]   Follow-up Appointment(s) Schedule your appointment via [employer]   or contact Scheduling [phone]   Billing Review your statement via [employer]  or contact Billing [phone]   Medical Records Review your records via [employer]   or contact Medical Records [phone]     You can also easily access your medical information, test results and upcoming appointments via the [employer] free online health management tool. You can learn more and sign up at [employer]. For assistance setting up your [employer] account, please call [phone].    Once again, we want to ensure your discharge home is safe and that you have a clear understanding of any next steps in your care. If you have any questions or concerns, please do not hesitate to contact us, we are here for you. Thank you for choosing [facility] for your healthcare needs.    Sincerely,      Your [facility] Healthcare Team

## 2017-10-11 NOTE — ED NOTES
Pt reports symptoms have resolved, no longer tremulous, VS stable. Pt states she feels ready to go home.

## 2017-10-11 NOTE — ED NOTES
"Chief Complaint   Patient presents with   • Chills     Pt reports being cold, shaking since aprox 1600 today. Tremulous in triage. Recieved chemo this afternoon.    • Nausea     When chemo started.    • Head Ache      tingly\" HA or \" a few hours\" off and on.        "

## 2017-10-25 ENCOUNTER — HOSPITAL ENCOUNTER (OUTPATIENT)
Dept: RADIOLOGY | Facility: MEDICAL CENTER | Age: 52
End: 2017-10-25
Attending: INTERNAL MEDICINE
Payer: COMMERCIAL

## 2017-10-25 DIAGNOSIS — C18.9 MALIGNANT NEOPLASM OF COLON, UNSPECIFIED PART OF COLON (HCC): ICD-10-CM

## 2017-10-25 PROCEDURE — A9552 F18 FDG: HCPCS

## 2018-01-20 ENCOUNTER — HOSPITAL ENCOUNTER (OUTPATIENT)
Dept: RADIOLOGY | Facility: MEDICAL CENTER | Age: 53
End: 2018-01-20
Attending: INTERNAL MEDICINE
Payer: COMMERCIAL

## 2018-01-20 DIAGNOSIS — C18.9 MALIGNANT NEOPLASM OF COLON, UNSPECIFIED PART OF COLON (HCC): ICD-10-CM

## 2018-01-20 PROCEDURE — 700117 HCHG RX CONTRAST REV CODE 255: Performed by: INTERNAL MEDICINE

## 2018-01-20 PROCEDURE — 71260 CT THORAX DX C+: CPT

## 2018-01-20 RX ADMIN — IOHEXOL 100 ML: 350 INJECTION, SOLUTION INTRAVENOUS at 14:30

## 2018-01-20 RX ADMIN — IOHEXOL 50 ML: 240 INJECTION, SOLUTION INTRATHECAL; INTRAVASCULAR; INTRAVENOUS; ORAL at 13:30

## 2018-04-04 ENCOUNTER — HOSPITAL ENCOUNTER (OUTPATIENT)
Dept: LAB | Facility: MEDICAL CENTER | Age: 53
End: 2018-04-04
Attending: INTERNAL MEDICINE
Payer: COMMERCIAL

## 2018-04-04 PROCEDURE — 80053 COMPREHEN METABOLIC PANEL: CPT

## 2018-04-04 PROCEDURE — 83690 ASSAY OF LIPASE: CPT

## 2018-04-04 PROCEDURE — 82248 BILIRUBIN DIRECT: CPT

## 2018-04-04 PROCEDURE — 82150 ASSAY OF AMYLASE: CPT

## 2018-04-05 LAB
ALBUMIN SERPL BCP-MCNC: 4.4 G/DL (ref 3.2–4.9)
ALBUMIN/GLOB SERPL: 1.8 G/DL
ALP SERPL-CCNC: 117 U/L (ref 30–99)
ALT SERPL-CCNC: 52 U/L (ref 2–50)
AMYLASE SERPL-CCNC: 44 U/L (ref 20–103)
ANION GAP SERPL CALC-SCNC: 9 MMOL/L (ref 0–11.9)
AST SERPL-CCNC: 38 U/L (ref 12–45)
BILIRUB CONJ SERPL-MCNC: 0.3 MG/DL (ref 0.1–0.5)
BILIRUB INDIRECT SERPL-MCNC: 2.1 MG/DL (ref 0–1)
BILIRUB SERPL-MCNC: 2.4 MG/DL (ref 0.1–1.5)
BUN SERPL-MCNC: 7 MG/DL (ref 8–22)
CALCIUM SERPL-MCNC: 9.2 MG/DL (ref 8.5–10.5)
CHLORIDE SERPL-SCNC: 106 MMOL/L (ref 96–112)
CO2 SERPL-SCNC: 24 MMOL/L (ref 20–33)
CREAT SERPL-MCNC: 0.63 MG/DL (ref 0.5–1.4)
GLOBULIN SER CALC-MCNC: 2.4 G/DL (ref 1.9–3.5)
GLUCOSE SERPL-MCNC: 77 MG/DL (ref 65–99)
LIPASE SERPL-CCNC: 49 U/L (ref 11–82)
POTASSIUM SERPL-SCNC: 4.1 MMOL/L (ref 3.6–5.5)
PROT SERPL-MCNC: 6.8 G/DL (ref 6–8.2)
SODIUM SERPL-SCNC: 139 MMOL/L (ref 135–145)

## 2018-04-08 ENCOUNTER — HOSPITAL ENCOUNTER (OUTPATIENT)
Dept: RADIOLOGY | Facility: MEDICAL CENTER | Age: 53
End: 2018-04-08
Attending: INTERNAL MEDICINE
Payer: COMMERCIAL

## 2018-04-08 DIAGNOSIS — C18.9 MALIGNANT NEOPLASM OF COLON, UNSPECIFIED PART OF COLON (HCC): ICD-10-CM

## 2018-04-08 PROCEDURE — 71260 CT THORAX DX C+: CPT

## 2018-04-08 PROCEDURE — 700117 HCHG RX CONTRAST REV CODE 255: Performed by: INTERNAL MEDICINE

## 2018-04-08 RX ADMIN — IOHEXOL 50 ML: 240 INJECTION, SOLUTION INTRATHECAL; INTRAVASCULAR; INTRAVENOUS; ORAL at 08:32

## 2018-04-08 RX ADMIN — IOHEXOL 100 ML: 350 INJECTION, SOLUTION INTRAVENOUS at 09:47

## 2018-07-21 ENCOUNTER — HOSPITAL ENCOUNTER (OUTPATIENT)
Dept: RADIOLOGY | Facility: MEDICAL CENTER | Age: 53
End: 2018-07-21
Attending: INTERNAL MEDICINE
Payer: COMMERCIAL

## 2018-07-21 DIAGNOSIS — C18.9 MALIGNANT NEOPLASM OF COLON, UNSPECIFIED PART OF COLON (HCC): ICD-10-CM

## 2018-07-21 PROCEDURE — 700117 HCHG RX CONTRAST REV CODE 255: Performed by: INTERNAL MEDICINE

## 2018-07-21 PROCEDURE — 71260 CT THORAX DX C+: CPT

## 2018-07-21 RX ADMIN — IOHEXOL 50 ML: 240 INJECTION, SOLUTION INTRATHECAL; INTRAVASCULAR; INTRAVENOUS; ORAL at 07:56

## 2018-07-21 RX ADMIN — IOHEXOL 100 ML: 350 INJECTION, SOLUTION INTRAVENOUS at 09:33

## 2018-10-12 ENCOUNTER — HOSPITAL ENCOUNTER (OUTPATIENT)
Dept: RADIOLOGY | Facility: MEDICAL CENTER | Age: 53
End: 2018-10-12
Attending: INTERNAL MEDICINE
Payer: COMMERCIAL

## 2018-10-12 DIAGNOSIS — N64.4 BREAST PAIN: ICD-10-CM

## 2018-10-12 DIAGNOSIS — C18.9 MALIGNANT NEOPLASM OF COLON, UNSPECIFIED PART OF COLON (HCC): ICD-10-CM

## 2018-10-12 PROCEDURE — 76642 ULTRASOUND BREAST LIMITED: CPT | Mod: LT

## 2018-10-12 PROCEDURE — G0279 TOMOSYNTHESIS, MAMMO: HCPCS

## 2018-11-09 ENCOUNTER — HOSPITAL ENCOUNTER (OUTPATIENT)
Dept: RADIOLOGY | Facility: MEDICAL CENTER | Age: 53
End: 2018-11-09
Attending: INTERNAL MEDICINE
Payer: COMMERCIAL

## 2018-11-09 DIAGNOSIS — C18.9 MALIGNANT NEOPLASM OF COLON, UNSPECIFIED PART OF COLON (HCC): ICD-10-CM

## 2018-11-09 PROCEDURE — 700117 HCHG RX CONTRAST REV CODE 255: Performed by: INTERNAL MEDICINE

## 2018-11-09 PROCEDURE — 71260 CT THORAX DX C+: CPT

## 2018-11-09 RX ADMIN — IOHEXOL 100 ML: 350 INJECTION, SOLUTION INTRAVENOUS at 18:29

## 2018-11-09 RX ADMIN — IOHEXOL 50 ML: 240 INJECTION, SOLUTION INTRATHECAL; INTRAVASCULAR; INTRAVENOUS; ORAL at 18:29

## 2018-11-19 ENCOUNTER — HOSPITAL ENCOUNTER (OUTPATIENT)
Facility: MEDICAL CENTER | Age: 53
End: 2018-11-19
Attending: INTERNAL MEDICINE | Admitting: INTERNAL MEDICINE
Payer: COMMERCIAL

## 2018-11-19 ENCOUNTER — APPOINTMENT (OUTPATIENT)
Dept: RADIOLOGY | Facility: MEDICAL CENTER | Age: 53
End: 2018-11-19
Attending: RADIOLOGY
Payer: COMMERCIAL

## 2018-11-19 ENCOUNTER — APPOINTMENT (OUTPATIENT)
Dept: RADIOLOGY | Facility: MEDICAL CENTER | Age: 53
End: 2018-11-19
Attending: INTERNAL MEDICINE
Payer: COMMERCIAL

## 2018-11-19 VITALS
TEMPERATURE: 97.3 F | HEART RATE: 61 BPM | RESPIRATION RATE: 18 BRPM | OXYGEN SATURATION: 98 % | BODY MASS INDEX: 19.64 KG/M2 | DIASTOLIC BLOOD PRESSURE: 78 MMHG | SYSTOLIC BLOOD PRESSURE: 123 MMHG | WEIGHT: 110.89 LBS

## 2018-11-19 DIAGNOSIS — C18.9 MALIGNANT NEOPLASM OF COLON, UNSPECIFIED PART OF COLON (HCC): ICD-10-CM

## 2018-11-19 LAB
INR PPP: 1.01 (ref 0.87–1.13)
PATHOLOGY CONSULT NOTE: NORMAL
PLATELET # BLD AUTO: 173 K/UL (ref 164–446)
PROTHROMBIN TIME: 13.4 SEC (ref 12–14.6)

## 2018-11-19 PROCEDURE — 700111 HCHG RX REV CODE 636 W/ 250 OVERRIDE (IP): Performed by: RADIOLOGY

## 2018-11-19 PROCEDURE — 88305 TISSUE EXAM BY PATHOLOGIST: CPT

## 2018-11-19 PROCEDURE — 160002 HCHG RECOVERY MINUTES (STAT)

## 2018-11-19 PROCEDURE — 88341 IMHCHEM/IMCYTCHM EA ADD ANTB: CPT | Mod: 91

## 2018-11-19 PROCEDURE — 71045 X-RAY EXAM CHEST 1 VIEW: CPT

## 2018-11-19 PROCEDURE — 88360 TUMOR IMMUNOHISTOCHEM/MANUAL: CPT

## 2018-11-19 PROCEDURE — 32405 CT-BIOPSY-LUNG/MEDIASTINUM: CPT | Mod: LT

## 2018-11-19 PROCEDURE — 85049 AUTOMATED PLATELET COUNT: CPT

## 2018-11-19 PROCEDURE — A9270 NON-COVERED ITEM OR SERVICE: HCPCS

## 2018-11-19 PROCEDURE — 85610 PROTHROMBIN TIME: CPT

## 2018-11-19 PROCEDURE — 700111 HCHG RX REV CODE 636 W/ 250 OVERRIDE (IP)

## 2018-11-19 PROCEDURE — 88342 IMHCHEM/IMCYTCHM 1ST ANTB: CPT

## 2018-11-19 PROCEDURE — 700102 HCHG RX REV CODE 250 W/ 637 OVERRIDE(OP)

## 2018-11-19 RX ORDER — LIDOCAINE HYDROCHLORIDE 10 MG/ML
INJECTION, SOLUTION EPIDURAL; INFILTRATION; INTRACAUDAL; PERINEURAL
Status: COMPLETED
Start: 2018-11-19 | End: 2018-11-19

## 2018-11-19 RX ORDER — MIDAZOLAM HYDROCHLORIDE 1 MG/ML
INJECTION INTRAMUSCULAR; INTRAVENOUS
Status: COMPLETED
Start: 2018-11-19 | End: 2018-11-19

## 2018-11-19 RX ORDER — NALOXONE HYDROCHLORIDE 0.4 MG/ML
INJECTION, SOLUTION INTRAMUSCULAR; INTRAVENOUS; SUBCUTANEOUS
Status: COMPLETED
Start: 2018-11-19 | End: 2018-11-19

## 2018-11-19 RX ORDER — SODIUM CHLORIDE 9 MG/ML
1000 INJECTION, SOLUTION INTRAVENOUS
Status: DISCONTINUED | OUTPATIENT
Start: 2018-11-19 | End: 2018-11-19 | Stop reason: HOSPADM

## 2018-11-19 RX ORDER — OXYCODONE HYDROCHLORIDE 5 MG/1
TABLET ORAL
Status: COMPLETED
Start: 2018-11-19 | End: 2018-11-19

## 2018-11-19 RX ORDER — SODIUM CHLORIDE 9 MG/ML
500 INJECTION, SOLUTION INTRAVENOUS
Status: DISCONTINUED | OUTPATIENT
Start: 2018-11-19 | End: 2018-11-19 | Stop reason: HOSPADM

## 2018-11-19 RX ORDER — ONDANSETRON 2 MG/ML
4 INJECTION INTRAMUSCULAR; INTRAVENOUS EVERY 8 HOURS PRN
Status: DISCONTINUED | OUTPATIENT
Start: 2018-11-19 | End: 2018-11-19 | Stop reason: HOSPADM

## 2018-11-19 RX ORDER — MIDAZOLAM HYDROCHLORIDE 1 MG/ML
.5-2 INJECTION INTRAMUSCULAR; INTRAVENOUS PRN
Status: DISCONTINUED | OUTPATIENT
Start: 2018-11-19 | End: 2018-11-19 | Stop reason: HOSPADM

## 2018-11-19 RX ORDER — OXYCODONE HYDROCHLORIDE 5 MG/1
2.5 TABLET ORAL
Status: DISCONTINUED | OUTPATIENT
Start: 2018-11-19 | End: 2018-11-19 | Stop reason: HOSPADM

## 2018-11-19 RX ORDER — ONDANSETRON 2 MG/ML
4 INJECTION INTRAMUSCULAR; INTRAVENOUS PRN
Status: DISCONTINUED | OUTPATIENT
Start: 2018-11-19 | End: 2018-11-19 | Stop reason: HOSPADM

## 2018-11-19 RX ADMIN — MIDAZOLAM 1 MG: 1 INJECTION INTRAMUSCULAR; INTRAVENOUS at 08:58

## 2018-11-19 RX ADMIN — FENTANYL CITRATE 25 MCG: 50 INJECTION, SOLUTION INTRAMUSCULAR; INTRAVENOUS at 08:55

## 2018-11-19 RX ADMIN — FENTANYL CITRATE 50 MCG: 50 INJECTION, SOLUTION INTRAMUSCULAR; INTRAVENOUS at 08:58

## 2018-11-19 RX ADMIN — OXYCODONE HYDROCHLORIDE 2.5 MG: 5 TABLET ORAL at 11:15

## 2018-11-19 RX ADMIN — LIDOCAINE HYDROCHLORIDE 5 ML: 10 INJECTION, SOLUTION EPIDURAL; INFILTRATION; INTRACAUDAL; PERINEURAL at 09:00

## 2018-11-19 RX ADMIN — SODIUM CHLORIDE 1000 ML: 9 INJECTION, SOLUTION INTRAVENOUS at 07:18

## 2018-11-19 RX ADMIN — FENTANYL CITRATE 25 MCG: 50 INJECTION, SOLUTION INTRAMUSCULAR; INTRAVENOUS at 09:04

## 2018-11-19 RX ADMIN — MIDAZOLAM 2 MG: 1 INJECTION INTRAMUSCULAR; INTRAVENOUS at 08:55

## 2018-11-19 RX ADMIN — MIDAZOLAM 1 MG: 1 INJECTION INTRAMUSCULAR; INTRAVENOUS at 09:04

## 2018-11-19 ASSESSMENT — PAIN SCALES - GENERAL
PAINLEVEL_OUTOF10: 0
PAINLEVEL_OUTOF10: 4
PAINLEVEL_OUTOF10: 0
PAINLEVEL_OUTOF10: 0

## 2018-11-19 NOTE — PROGRESS NOTES
IR Procedure Note:    Site Marked and Confirmed with MD, patient and RN pre procedure.     Dr. Joe performed a CT guided biopsy of left parasternal mass.  8 x Cores in formalin.  The pt tolerated the procedure well; ETCo2 baseline 34, with consistent waveform during the procedure.      Bandaid applied to  Left of midline chest, CDI and soft; pressure held x 3 minutes.  Pt alert, oriented and verbally appropriate post procedure, vital signs stable during procedure and transport, see flow sheet for vital signs.  Report given to MARSHA Allison.  RN transported pt to PPU with Sao2 monitor = 97% on room air.      Sedation End Time: 0908

## 2018-11-19 NOTE — DISCHARGE INSTRUCTIONS
ACTIVITY: Rest and take it easy for the first 24 hours.  A responsible adult is recommended to remain with you during that time.  It is normal to feel sleepy.  We encourage you to not do anything that requires balance, judgment or coordination.    MILD FLU-LIKE SYMPTOMS ARE NORMAL. YOU MAY EXPERIENCE GENERALIZED MUSCLE ACHES, THROAT IRRITATION, HEADACHE AND/OR SOME NAUSEA.    FOR 24 HOURS DO NOT:  Drive, operate machinery or run household appliances.  Drink beer or alcoholic beverages.   Make important decisions or sign legal documents.    SPECIAL INSTRUCTIONS: follow up with primary care physician as needed  If you experience chest pain, shortness of breath call 911 return to ER  Resume your medications     DIET: To avoid nausea, slowly advance diet as tolerated, avoiding spicy or greasy foods for the first day.  Add more substantial food to your diet according to your physician's instructions.  Babies can be fed formula or breast milk as soon as they are hungry.  INCREASE FLUIDS AND FIBER TO AVOID CONSTIPATION.    SURGICAL DRESSING/BATHING: keep dressing clean dry intact for 24 hours, you may remove dressing after 24 hours.    FOLLOW-UP APPOINTMENT:  A follow-up appointment should be arranged with your doctor in [phone]; call to schedule.    You should CALL YOUR PHYSICIAN if you develop:  Fever greater than 101 degrees F.  Pain not relieved by medication, or persistent nausea or vomiting.  Excessive bleeding (blood soaking through dressing) or unexpected drainage from the wound.  Extreme redness or swelling around the incision site, drainage of pus or foul smelling drainage.  Inability to urinate or empty your bladder within 8 hours.  Problems with breathing or chest pain.    You should call 911 if you develop problems with breathing or chest pain.  If you are unable to contact your doctor or surgical center, you should go to the nearest emergency room or urgent care center.  Physician's telephone #:  [phone]    If any questions arise, call your doctor.  If your doctor is not available, please feel free to call the Surgical Center at [phone].  The Center is open Monday through Friday from 7AM to 7PM.  You can also call the HEALTH HOTLINE open 24 hours/day, 7 days/week and speak to a nurse at [phone], or toll free at [phone].    A registered nurse may call you a few days after your surgery to see how you are doing after your procedure.    MEDICATIONS: Resume taking daily medication.  Take prescribed pain medication with food.  If no medication is prescribed, you may take non-aspirin pain medication if needed.  PAIN MEDICATION CAN BE VERY CONSTIPATING.  Take a stool softener or laxative such as senokot, pericolace, or milk of magnesia if needed.  Biopsy  A biopsy is a procedure in which small samples of tissue are removed from the body. The tissue is examined under a microscope. A biopsy may be done to determine the cause (diagnosis) of a condition or mass (tumor). A biopsy may also be done to determine the best treatment for you. In some instances, a biopsy may be performed on normal tissue to determine if cancer has spread or if a transplanted organ is being rejected. There are 2 ways to obtain samples:  · Fine needle biopsy. Samples are removed using a thin needle inserted through the skin.   · Open biopsy. Samples are removed after a cut (incision) is made through the skin.   LET YOUR CAREGIVER KNOW ABOUT:  · Allergies to food or medicine.   · Medicines taken, including vitamins, herbs, eyedrops, over-the-counter medicines, and creams.   · Use of steroids (by mouth or creams).   · Previous problems with anesthetics or numbing medicines.   · History of bleeding problems or blood clots.   · Previous surgery.   · Other health problems, including diabetes and kidney problems.   · Possibility of pregnancy, if this applies.   RISKS AND COMPLICATIONS  · Bleeding from the biopsy site. The risk of  bleeding is higher if you have a bleeding disorder or are taking any blood thinning medicines (anticoagulants).   · Infection.   · Injury to organs or structures near the biopsy site.   · Chronic pain at the biopsy site. This is defined as pain that lasts for more than 3 months.   · Very rarely, a second biopsy may be required if not enough tissue was collected during the first biopsy.   BEFORE THE PROCEDURE  Ask your caregiver what time you need to arrive for your procedure. Ask your caregiver whether you need to stop eating or drinking (fast) before your procedure. Ask your caregiver about changing or stopping your regular medicines. A blood sample may be done to determine your blood clotting time. Medicine may be given to help you relax (sedative).  PROCEDURE  During a fine needle biopsy, you will be awake during the procedure. You will be positioned to allow the best possible access to the biopsy site. Let your caregiver know if the position is not comfortable. The biopsy site will be cleaned. A needle is inserted through your skin. You may feel mild discomfort during this procedure. The needle is withdrawn once tissue samples have been removed. Pressure may be applied to the biopsy site to reduce swelling and to ensure that bleeding has stopped. The samples will be sent to be examined.  During an open biopsy, you may be given medicine that numbs the area (local anesthetic) or medicine that makes you sleep (general anesthetic). An incision is made through the skin. A tissue sample or the entire mass is removed. The sample or mass will be sent to be examined. Sometimes, the sample or mass may be examined during the procedure. If the sample or mass contains cancer cells, further tissue or structures may be removed. The incision is then closed with stitches (sutures) or skin glue (adhesive).  AFTER THE PROCEDURE  Your recovery will be assessed and monitored. If there are no problems, you should be able to go home  shortly after the procedure (outpatient). You will need to arrange for someone to drive you home if you received a sedative or pain relieving medicine during the procedure. Ask when your test results will be ready. Make sure you get your test results.  Document Released: 12/15/2001 Document Revised: 03/11/2013 Document Reviewed: 06/14/2012  ExitCare® Patient Information ©2013 [employer].    If your physician has prescribed pain medication that includes Acetaminophen (Tylenol), do not take additional Acetaminophen (Tylenol) while taking the prescribed medication.    Depression / Suicide Risk    As you are discharged from this [facility] facility, it is important to learn how to keep safe from harming yourself.    Recognize the warning signs:  · Abrupt changes in personality, positive or negative- including increase in energy   · Giving away possessions  · Change in eating patterns- significant weight changes-  positive or negative  · Change in sleeping patterns- unable to sleep or sleeping all the time   · Unwillingness or inability to communicate  · Depression  · Unusual sadness, discouragement and loneliness  · Talk of wanting to die  · Neglect of personal appearance   · Rebelliousness- reckless behavior  · Withdrawal from people/activities they love  · Confusion- inability to concentrate     If you or a loved one observes any of these behaviors or has concerns about self-harm, here's what you can do:  · Talk about it- your feelings and reasons for harming yourself  · Remove any means that you might use to hurt yourself (examples: pills, rope, extension cords, firearm)  · Get professional help from the community (Mental Health, Substance Abuse, psychological counseling)  · Do not be alone:Call your Safe Contact- someone whom you trust who will be there for you.  · Call your local CRISIS HOTLINE 641-9804 or 550-785-2955  · Call your local Children's Mobile Crisis Response Team [place] (752) 556-3035 or  www.[employer].[place]  · Call the toll free National Suicide Prevention Hotlines   · National Suicide Prevention Lifeline 558-480-KRBA (4606)  · National Hope Line Network 800-SUICIDE (777-8762)

## 2018-11-19 NOTE — OR NURSING
0924 Patient arrived from IR s/p LEFT PARASTERNAL CHEST WALL MASS biopsy, access site left of midline chest band aid clean dry intact, patient awake, denies pain, s.o.b, vss.   1021 1st x-ray done.   1120 patient given water tolerating well.  1227 discharge instructions given to patient, patient verbalize understanding of the orders.   1255 criteria met to discharge patient home.  1300 patient escorted via w/c with all her personal belongings.

## 2018-11-19 NOTE — OR SURGEON
Immediate Post- Operative Note    PostOp Diagnosis: LEFT PARASTERNAL CHEST WALL MASS, HX COLON CA WITH METS       Procedure(s): CT GUIDED LEFT ANTERIOR CHEST WALL BIOPSY    18G CORES X 8 IN FORMALIN      Estimated Blood Loss: <5CC        Complications: NONE          11/19/2018     9:06 AM     Mario Joe

## 2019-01-01 ENCOUNTER — HOSPITAL ENCOUNTER (OUTPATIENT)
Dept: LAB | Facility: MEDICAL CENTER | Age: 54
End: 2019-09-09
Attending: INTERNAL MEDICINE
Payer: COMMERCIAL

## 2019-01-01 ENCOUNTER — HOSPITAL ENCOUNTER (OUTPATIENT)
Dept: RADIOLOGY | Facility: MEDICAL CENTER | Age: 54
End: 2019-11-09
Attending: INTERNAL MEDICINE
Payer: COMMERCIAL

## 2019-01-01 ENCOUNTER — HOSPITAL ENCOUNTER (OUTPATIENT)
Dept: LAB | Facility: MEDICAL CENTER | Age: 54
End: 2019-09-26
Attending: INTERNAL MEDICINE
Payer: COMMERCIAL

## 2019-01-01 ENCOUNTER — HOSPITAL ENCOUNTER (OUTPATIENT)
Dept: RADIOLOGY | Facility: MEDICAL CENTER | Age: 54
End: 2019-09-07
Attending: INTERNAL MEDICINE
Payer: COMMERCIAL

## 2019-01-01 DIAGNOSIS — C18.9 MALIGNANT NEOPLASM OF COLON, UNSPECIFIED PART OF COLON (HCC): ICD-10-CM

## 2019-01-01 DIAGNOSIS — C78.1 SECONDARY MALIGNANT NEOPLASM OF MEDIASTINUM (HCC): ICD-10-CM

## 2019-01-01 LAB
ALBUMIN SERPL BCP-MCNC: 4.4 G/DL (ref 3.2–4.9)
ALBUMIN SERPL BCP-MCNC: 4.7 G/DL (ref 3.2–4.9)
ALBUMIN/GLOB SERPL: 1.8 G/DL
ALBUMIN/GLOB SERPL: 2.5 G/DL
ALP SERPL-CCNC: 112 U/L (ref 30–99)
ALP SERPL-CCNC: 87 U/L (ref 30–99)
ALT SERPL-CCNC: 31 U/L (ref 2–50)
ALT SERPL-CCNC: 54 U/L (ref 2–50)
AMBIGUOUS DTTM AMBI4: NORMAL
ANION GAP SERPL CALC-SCNC: 12 MMOL/L (ref 0–11.9)
ANION GAP SERPL CALC-SCNC: 8 MMOL/L (ref 0–11.9)
AST SERPL-CCNC: 25 U/L (ref 12–45)
AST SERPL-CCNC: 38 U/L (ref 12–45)
BILIRUB SERPL-MCNC: 1 MG/DL (ref 0.1–1.5)
BILIRUB SERPL-MCNC: 1.4 MG/DL (ref 0.1–1.5)
BUN SERPL-MCNC: 7 MG/DL (ref 8–22)
BUN SERPL-MCNC: 8 MG/DL (ref 8–22)
CALCIUM SERPL-MCNC: 9.5 MG/DL (ref 8.5–10.5)
CALCIUM SERPL-MCNC: 9.6 MG/DL (ref 8.5–10.5)
CHLORIDE SERPL-SCNC: 106 MMOL/L (ref 96–112)
CHLORIDE SERPL-SCNC: 106 MMOL/L (ref 96–112)
CO2 SERPL-SCNC: 25 MMOL/L (ref 20–33)
CO2 SERPL-SCNC: 27 MMOL/L (ref 20–33)
CREAT SERPL-MCNC: 0.57 MG/DL (ref 0.5–1.4)
CREAT SERPL-MCNC: 0.59 MG/DL (ref 0.5–1.4)
GLOBULIN SER CALC-MCNC: 1.9 G/DL (ref 1.9–3.5)
GLOBULIN SER CALC-MCNC: 2.5 G/DL (ref 1.9–3.5)
GLUCOSE SERPL-MCNC: 83 MG/DL (ref 65–99)
GLUCOSE SERPL-MCNC: 87 MG/DL (ref 65–99)
POTASSIUM SERPL-SCNC: 3.6 MMOL/L (ref 3.6–5.5)
POTASSIUM SERPL-SCNC: 3.9 MMOL/L (ref 3.6–5.5)
PROT SERPL-MCNC: 6.6 G/DL (ref 6–8.2)
PROT SERPL-MCNC: 6.9 G/DL (ref 6–8.2)
SODIUM SERPL-SCNC: 141 MMOL/L (ref 135–145)
SODIUM SERPL-SCNC: 143 MMOL/L (ref 135–145)

## 2019-01-01 PROCEDURE — 80053 COMPREHEN METABOLIC PANEL: CPT

## 2019-01-01 PROCEDURE — 71260 CT THORAX DX C+: CPT

## 2019-01-01 PROCEDURE — 700117 HCHG RX CONTRAST REV CODE 255: Performed by: INTERNAL MEDICINE

## 2019-01-01 RX ADMIN — IOHEXOL 80 ML: 350 INJECTION, SOLUTION INTRAVENOUS at 15:00

## 2019-01-01 RX ADMIN — IOHEXOL 25 ML: 240 INJECTION, SOLUTION INTRATHECAL; INTRAVASCULAR; INTRAVENOUS; ORAL at 15:25

## 2019-01-01 RX ADMIN — IOHEXOL 25 ML: 240 INJECTION, SOLUTION INTRATHECAL; INTRAVASCULAR; INTRAVENOUS; ORAL at 15:00

## 2019-01-01 RX ADMIN — IOHEXOL 90 ML: 350 INJECTION, SOLUTION INTRAVENOUS at 15:25

## 2019-01-04 ENCOUNTER — HOSPITAL ENCOUNTER (OUTPATIENT)
Dept: CARDIOLOGY | Facility: MEDICAL CENTER | Age: 54
End: 2019-01-04
Attending: NURSE PRACTITIONER
Payer: COMMERCIAL

## 2019-01-04 PROCEDURE — 93010 ELECTROCARDIOGRAM REPORT: CPT | Performed by: INTERNAL MEDICINE

## 2019-01-04 PROCEDURE — 93005 ELECTROCARDIOGRAM TRACING: CPT | Performed by: NURSE PRACTITIONER

## 2019-01-05 LAB — EKG IMPRESSION: NORMAL

## 2019-01-08 ENCOUNTER — HOSPITAL ENCOUNTER (OUTPATIENT)
Dept: LAB | Facility: MEDICAL CENTER | Age: 54
End: 2019-01-08
Attending: NURSE PRACTITIONER
Payer: COMMERCIAL

## 2019-01-08 LAB — CEA SERPL-MCNC: 1.3 NG/ML (ref 0–3)

## 2019-01-08 PROCEDURE — 82378 CARCINOEMBRYONIC ANTIGEN: CPT

## 2019-01-12 ENCOUNTER — HOSPITAL ENCOUNTER (OUTPATIENT)
Dept: RADIOLOGY | Facility: MEDICAL CENTER | Age: 54
End: 2019-01-12
Attending: INTERNAL MEDICINE
Payer: COMMERCIAL

## 2019-01-12 DIAGNOSIS — C7A.1 MALIGNANT POORLY DIFFERENTIATED NEUROENDOCRINE TUMORS (HCC): ICD-10-CM

## 2019-01-12 PROCEDURE — 700117 HCHG RX CONTRAST REV CODE 255: Performed by: INTERNAL MEDICINE

## 2019-01-12 PROCEDURE — 71260 CT THORAX DX C+: CPT

## 2019-01-12 RX ADMIN — IOHEXOL 50 ML: 240 INJECTION, SOLUTION INTRATHECAL; INTRAVASCULAR; INTRAVENOUS; ORAL at 10:22

## 2019-01-12 RX ADMIN — IOHEXOL 100 ML: 350 INJECTION, SOLUTION INTRAVENOUS at 10:22

## 2019-01-21 ENCOUNTER — PATIENT OUTREACH (OUTPATIENT)
Dept: OTHER | Facility: MEDICAL CENTER | Age: 54
End: 2019-01-21

## 2019-01-29 ENCOUNTER — HOSPITAL ENCOUNTER (OUTPATIENT)
Dept: LAB | Facility: MEDICAL CENTER | Age: 54
End: 2019-01-29
Attending: NURSE PRACTITIONER
Payer: COMMERCIAL

## 2019-01-29 PROCEDURE — 80053 COMPREHEN METABOLIC PANEL: CPT

## 2019-01-30 LAB
ALBUMIN SERPL BCP-MCNC: 4.3 G/DL (ref 3.2–4.9)
ALBUMIN/GLOB SERPL: 1.7 G/DL
ALP SERPL-CCNC: 121 U/L (ref 30–99)
ALT SERPL-CCNC: 37 U/L (ref 2–50)
ANION GAP SERPL CALC-SCNC: 10 MMOL/L (ref 0–11.9)
AST SERPL-CCNC: 37 U/L (ref 12–45)
BILIRUB SERPL-MCNC: 1.1 MG/DL (ref 0.1–1.5)
BUN SERPL-MCNC: 8 MG/DL (ref 8–22)
CALCIUM SERPL-MCNC: 9.1 MG/DL (ref 8.5–10.5)
CHLORIDE SERPL-SCNC: 108 MMOL/L (ref 96–112)
CO2 SERPL-SCNC: 25 MMOL/L (ref 20–33)
CREAT SERPL-MCNC: 0.58 MG/DL (ref 0.5–1.4)
GLOBULIN SER CALC-MCNC: 2.6 G/DL (ref 1.9–3.5)
GLUCOSE SERPL-MCNC: 88 MG/DL (ref 65–99)
POTASSIUM SERPL-SCNC: 3.9 MMOL/L (ref 3.6–5.5)
PROT SERPL-MCNC: 6.9 G/DL (ref 6–8.2)
SODIUM SERPL-SCNC: 143 MMOL/L (ref 135–145)

## 2019-01-31 ENCOUNTER — HOSPITAL ENCOUNTER (OUTPATIENT)
Dept: RADIATION ONCOLOGY | Facility: MEDICAL CENTER | Age: 54
End: 2019-01-31
Attending: RADIOLOGY
Payer: COMMERCIAL

## 2019-01-31 VITALS
DIASTOLIC BLOOD PRESSURE: 46 MMHG | WEIGHT: 113 LBS | TEMPERATURE: 98.1 F | SYSTOLIC BLOOD PRESSURE: 109 MMHG | OXYGEN SATURATION: 98 % | HEIGHT: 63 IN | BODY MASS INDEX: 20.02 KG/M2 | HEART RATE: 70 BPM

## 2019-01-31 PROCEDURE — 99205 OFFICE O/P NEW HI 60 MIN: CPT | Performed by: RADIOLOGY

## 2019-01-31 PROCEDURE — 99212 OFFICE O/P EST SF 10 MIN: CPT | Performed by: RADIOLOGY

## 2019-01-31 RX ORDER — DIPHENOXYLATE HYDROCHLORIDE AND ATROPINE SULFATE 2.5; .025 MG/1; MG/1
1 TABLET ORAL 4 TIMES DAILY PRN
COMMUNITY
End: 2019-01-31

## 2019-01-31 ASSESSMENT — PAIN SCALES - GENERAL: PAINLEVEL: NO PAIN

## 2019-01-31 NOTE — NON-PROVIDER
"Patient was seen today in clinic with Dr. Calvert for Metastatic Colon Cancer.  Vitals signs and weight were obtained and pain assessment was completed.  Allergies and medications were reviewed with the patient.  Review of systems completed.     Vitals/Pain:  Vitals:    01/31/19 1241   BP: 109/46   BP Location: Left arm   Patient Position: Sitting   BP Cuff Size: Adult   Pulse: 70   Temp: 36.7 °C (98.1 °F)   TempSrc: Temporal   SpO2: 98%   Weight: 51.3 kg (113 lb)   Height: 1.6 m (5' 3\")   Pain Score: No pain        Allergies:   Pcn [penicillins]; Lexapro; Paxil [paroxetine]; and Zoloft    Current Medications:  Current Outpatient Prescriptions   Medication Sig Dispense Refill   • ALPRAZolam (XANAX) 0.25 MG Tab take 1 tablet by mouth three times a day if needed for sleep or anxiety for UP TO 30 DAYS 90 Tab 0   • Acetaminophen (TYLENOL PO) Take  by mouth.     • ondansetron (ZOFRAN) 8 MG Tab      • oxycodone immediate-release (ROXICODONE) 5 MG Tab Take 5 mg by mouth every four hours as needed for Severe Pain.     • docusate sodium (COLACE) 100 MG Cap Take 100 mg by mouth as needed for Constipation.       No current facility-administered medications for this encounter.          PCP:  Len Tapia R.N.  "

## 2019-01-31 NOTE — CONSULTS
RADIATION ONCOLOGY CONSULT    DATE OF SERVICE: 1/31/2019    IDENTIFICATION: A 53 y.o. 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    She is here at the kind request of Dr. Tate to discuss adjuvant radiotherapy to sternal region for painful metastasis that responded completely to systemic therapy.    HISTORY OF PRESENT ILLNESS: Patient presented October 2016 with high-grade bowel obstruction.  She was taken to surgery and was noted to have a near obstructing mass in the cecum as well as  metastatic deposits in the liver.  She underwent surgical resection of the cecal mass which demonstrated adenocarcinoma poor differentiation and focal neuroendocrine differentiation invading into the subserosal adipose tissue 8 out of 29 lymph nodes were positive for metastatic adenocarcinoma.  Liver biopsy demonstrated metastatic poorly differentiated carcinoma.    Adjuvantly she was treated with FOLFOX starting on 11/28/2016.  This was subsequently changed to FOLFIRI 1/10/2017.  She had concurrent Y 90 therapy to the liver on 5/15/2017 and 6/26/2017.  FOLFIRI was discontinued and Xeloda started on 11/12/2017.  She then began every other week Xeloda on 7/23/2018.    She began to develop a painful enlarging mass in the parasternal region.  She had a CT scan on November 9, 2018 demonstrating a 3 cm mass in the anterior mediastinum as well as a 3.9 cm left internal mammary chain adenopathy extending into the chest wall.  In addition there was metastasis noted involving the right hilum and pulmonary nodule involving the right and left lung subcentimeter suggestive of metastatic disease.    She underwent biopsy of the parasternal mass which demonstrated high-grade neuroendocrine carcinoma.  On review the neuroendocrine features were similar to area of focal neuroendocrine differentiation noted in her colon cancer.    Her chemotherapy regimen was changed from Xeloda to cisplatin etoposide.  She  underwent 4 cycles.  Repeat CT body 1/12/2019 demonstrated complete resolution of disease in the chest.  No other distant metastasis is noted at this point.    She reports complete resolution of her chest pain with start of systemic therapy.  Current complaints are fatigue, decreased appetite, decreased weight, and alteration in taste which she attributes to the chemotherapy.    PAST MEDICAL HISTORY:   Past Medical History:   Diagnosis Date   • Bowel habit changes     constipation   • Cancer (HCC) 2016    colon/liver (chemo/surgery)   • Closed head injury 2011   • Dog bite 4/15/17    left knee   • Femur fracture (HCC)    • GERD (gastroesophageal reflux disease)    • Gilbert's disease    • Metastatic colon cancer to liver (HCC)    • Palpitations    • Panic disorder     takes Xanax as needed   • PMDD (premenstrual dysphoric disorder)    • Skull fracture (HCC) 2011   • Vocal cord nodules        PAST SURGICAL HISTORY:  Past Surgical History:   Procedure Laterality Date   • CATH PLACEMENT Right 11/1/2016    Procedure: CATH PLACEMENT - PORT ;  Surgeon: Jose Borrego M.D.;  Location: SURGERY SAME DAY [facility];  Service:    • EXPLORATORY LAPAROTOMY  10/27/2016    Procedure: EXPLORATORY LAPAROTOMY;  Surgeon: Jose Borrego M.D.;  Location: SURGERY [facility];  Service:    • COLON RESECTION  10/27/2016    Procedure: RIGHT COLON RESECTION;  Surgeon: Jose Borrego M.D.;  Location: SURGERY [facility];  Service:    • LIVER BIOPSY  10/27/2016    Procedure: LIVER BIOPSY;  Surgeon: Jose Borrego M.D.;  Location: SURGERY [facility];  Service:    • OTHER      Y90 5/15/17, 6/26/17   • OTHER ORTHOPEDIC SURGERY      ORIF left femur       CURRENT MEDICATIONS:  Current Outpatient Prescriptions   Medication Sig Dispense Refill   • ALPRAZolam (XANAX) 0.25 MG Tab take 1 tablet by mouth three times a day if needed for sleep or anxiety for UP TO 30 DAYS 90 Tab 0   • Acetaminophen (TYLENOL PO) Take  by  "mouth.     • ondansetron (ZOFRAN) 8 MG Tab      • oxycodone immediate-release (ROXICODONE) 5 MG Tab Take 5 mg by mouth every four hours as needed for Severe Pain.     • docusate sodium (COLACE) 100 MG Cap Take 100 mg by mouth as needed for Constipation.       No current facility-administered medications for this encounter.        ALLERGIES:    Pcn [penicillins]; Lexapro; Paxil [paroxetine]; and Zoloft    FAMILY HISTORY:    Father - Lung Cancer, PGM - breast cancer    SOCIAL HISTORY:     reports that she quit smoking about 2 years ago. Her smoking use included Cigarettes. She has a 5.00 pack-year smoking history. She has quit using smokeless tobacco. She reports that she drinks alcohol. She reports that she does not use drugs.    REVIEW OF SYSTEMS:  Review of systems for today's date of service was reviewed and uploaded into the electronic medical record.     PAIN SCALE: 0-10  Pain Assessement: 0  Pain Location, Orientation and Scale: no complaints of pain at this time.     GYNECOLOGICAL STATUS:  : 2, Para: 2 and Number of Interrupted Pregnancies: 0   LMP: 6 months ago.     HORMONE USE:  Contraceptive hormone use 0 years    PHYSICAL EXAM:   ECOG PERFORMANCE STATUS:  0= Fully active, able to carry on all pre-disease performance without restriction.  /46 (BP Location: Left arm, Patient Position: Sitting, BP Cuff Size: Adult)   Pulse 70   Temp 36.7 °C (98.1 °F) (Temporal)   Ht 1.6 m (5' 3\")   Wt 51.3 kg (113 lb)   SpO2 98%   BMI 20.02 kg/m²   GENERAL: Alert, oriented, no acute distress  HEENT:  Pupils are equal, round, and reactive to light.  Extraocular muscles   are intact. Sclerae nonicteric.  Conjunctivae pink.  Oral cavity, tongue   protrudes midline.   NECK:  Supple without evidence of thyromegaly.  NODES:  No peripheral adenopathy of the neck, supraclavicular fossa or axillae   bilaterally.  LUNGS:  Clear to ascultation and resonant to percussion.  HEART:  Regular rate and rhythm.  No murmur " appreciated  ABDOMEN:  Soft. No evidence of hepatosplenomegaly.  Positive bowel sounds.  EXTREMITIES:  Without Edema.  NEUROLOGIC:  Cranial nerves II through XII were intact.  Strength is 5/5 in   lower extremities bilaterally.  DTRs were symmetrical.  There was no focal   sensory deficit appreciated.        LABORATORY DATA:   Lab Results   Component Value Date/Time    SODIUM 143 01/29/2019 09:33 AM    POTASSIUM 3.9 01/29/2019 09:33 AM    CHLORIDE 108 01/29/2019 09:33 AM    CO2 25 01/29/2019 09:33 AM    GLUCOSE 88 01/29/2019 09:33 AM    BUN 8 01/29/2019 09:33 AM    CREATININE 0.58 01/29/2019 09:33 AM     Lab Results   Component Value Date/Time    ALKPHOSPHAT 121 (H) 01/29/2019 09:33 AM    ASTSGOT 37 01/29/2019 09:33 AM    ALTSGPT 37 01/29/2019 09:33 AM    TBILIRUBIN 1.1 01/29/2019 09:33 AM      Lab Results   Component Value Date/Time    WBC 11.0 (H) 01/24/2019 08:01 AM    RBC 3.13 (L) 01/24/2019 08:01 AM    HEMOGLOBIN 10.8 (L) 01/24/2019 08:01 AM    HEMATOCRIT 32.3 (L) 01/24/2019 08:01 AM    .2 (H) 01/24/2019 08:01 AM    MCH 34.5 (H) 01/24/2019 08:01 AM    MCHC 33.4 01/24/2019 08:01 AM    MPV 10.1 01/24/2019 08:01 AM    NEUTSPOLYS 64 01/24/2019 08:01 AM    LYMPHOCYTES 10 (L) 01/24/2019 08:01 AM    MONOCYTES 7 01/24/2019 08:01 AM    EOSINOPHILS 0.00 10/10/2017 08:50 PM    BASOPHILS 0.30 10/10/2017 08:50 PM    ANISOCYTOSIS 2+ 01/24/2019 08:01 AM        RADIOLOGY DATA:  Ct-chest,abdomen,pelvis With    Result Date: 1/12/2019 1/12/2019 9:49 AM HISTORY/REASON FOR EXAM:  Carcinoma large bowel follow-up TECHNIQUE/EXAM DESCRIPTION: CT scan of the chest, abdomen and pelvis with contrast. Thin-section helical scanning was obtained with intravenous contrast from the lung apices through the pubic symphysis to include the chest, abdomen and pelvis.  80 mL of nonionic contrast was administered intravenously without complication. Low dose optimization technique was utilized for this CT exam including automated exposure  control and adjustment of the mA and/or kV according to patient size. COMPARISON: 11/09/2018 FINDINGS: CT CHEST: No pulmonary nodules or masses have developed in the lungs since the prior CT. Pulmonary nodules seen on prior exam no longer appear to be present. Linear opacifications in the lung apices are again noted. Emphysema is again noted. Anterior mediastinal mass is no longer identified. There is mild residual soft tissue thickening around the left internal mammary artery but the adenopathy and masses within the chest wall have otherwise resolved at this location. CT ABDOMEN: Irregular shape and volume loss within the liver are again noted. Solid mass in the gastrohepatic area seen on the prior CT is no longer present. No new solid organ abnormalities are identified in the liver spleen pancreas kidneys or adrenal glands. No adenopathy has developed since the prior exam. Postoperative changes consistent with right hemicolectomy are again noted. CT PELVIS: No pelvic adenopathy is appreciated. No free fluid is identified. No peritoneal inflammatory changes are identified.     1.  Resolution of anterior mediastinal mass since prior exam. Left anterior chest wall adenopathy in the internal mammary region is mostly resolved. Some minimal residual soft tissue thickening around the left internal mammary artery is noted. 2.  Resolution of pulmonary nodules since prior exam. 3.  Necrotic mass in the upper abdomen in the gastrohepatic region seen on the prior CT is no longer identified. 4.  No new CT evidence of metastatic disease in the chest abdomen or pelvis. 5.  Post therapeutic changes are again noted in the liver. Post right hemicolectomy changes are again noted.      IMPRESSION:    A 53 y.o. with metastatic colon cancer with neuroendocrine features.  Initially treated with systemic therapy for colon cancer with response.  She subsequently developed chest disease which on biopsy demonstrated neuroendocrine  differentiation.  She was treated with cisplatin etoposide chemotherapy with complete response.    RECOMMENDATIONS:   Reviewed pre-and post therapy imaging with patient.  She appears to have had an excellent response to systemic therapy thus far.  We did discuss consolidating the anterior mediastinum and parasternal region with radiotherapy which would consist of 3000 cGy in 10 fractions over 2 weeks similar to chest consolidation in the extensive stage small cell lung cancer setting as one option.  Alternatively considering that this is not small cell lung cancer, we could take a wait-and-see approach and offer radiotherapy if she were to have a local recurrence in the parasternal region primarily to prevent the pain and discomfort that she was experiencing on initial presentation.  We discussed the pros and cons of both approachs.  Treating now would definitely decrease the risk of recurrence locally but she may developed some esophagitis and mild pneumonitis as a result.  In addition she could have progression of disease elsewhere.  If we were to wait, I do not think we would lose much considering that this is behaving as small cell which should respond to radiotherapy.  After lengthy discussion, patient is opted to do the wait-and-see approach.  I deferred follow-up imaging to Dr. Tate.  Patient's been urged to contact either myself or Dr. Tate if she has any symptoms in her chest area that may suggest a recurrence of disease.    One hour was spent face-to-face with patient in the office and more than half of that time was spent counseling patient or coordinating care as described above.    Thank you for the opportunity to participate in her care.  If any questions or comments, please do not hesitate in calling.    Hayley FRANCISCO M.D.  Electronically signed by: Hayley Calvert V, 1/31/2019 4:13 PM  [phone]

## 2019-03-16 ENCOUNTER — HOSPITAL ENCOUNTER (OUTPATIENT)
Dept: RADIOLOGY | Facility: MEDICAL CENTER | Age: 54
End: 2019-03-16
Attending: INTERNAL MEDICINE
Payer: COMMERCIAL

## 2019-03-16 DIAGNOSIS — C7A.1 MALIGNANT POORLY DIFFERENTIATED NEUROENDOCRINE CARCINOMA (HCC): ICD-10-CM

## 2019-03-16 PROCEDURE — 71260 CT THORAX DX C+: CPT

## 2019-03-16 PROCEDURE — 700117 HCHG RX CONTRAST REV CODE 255: Performed by: INTERNAL MEDICINE

## 2019-03-16 RX ADMIN — IOHEXOL 100 ML: 350 INJECTION, SOLUTION INTRAVENOUS at 11:31

## 2019-03-16 RX ADMIN — IOHEXOL 50 ML: 240 INJECTION, SOLUTION INTRATHECAL; INTRAVASCULAR; INTRAVENOUS; ORAL at 09:35

## 2019-04-05 ENCOUNTER — APPOINTMENT (OUTPATIENT)
Dept: RADIOLOGY | Facility: MEDICAL CENTER | Age: 54
End: 2019-04-05
Attending: INTERNAL MEDICINE
Payer: COMMERCIAL

## 2019-04-10 ENCOUNTER — APPOINTMENT (OUTPATIENT)
Dept: RADIOLOGY | Facility: MEDICAL CENTER | Age: 54
End: 2019-04-10
Attending: INTERNAL MEDICINE
Payer: COMMERCIAL

## 2019-04-30 ENCOUNTER — HOSPITAL ENCOUNTER (OUTPATIENT)
Dept: LAB | Facility: MEDICAL CENTER | Age: 54
End: 2019-04-30
Attending: INTERNAL MEDICINE
Payer: COMMERCIAL

## 2019-04-30 LAB
ALBUMIN SERPL BCP-MCNC: 4.4 G/DL (ref 3.2–4.9)
ALBUMIN/GLOB SERPL: 2.1 G/DL
ALP SERPL-CCNC: 67 U/L (ref 30–99)
ALT SERPL-CCNC: 36 U/L (ref 2–50)
AMBIGUOUS DTTM AMBI4: NORMAL
ANION GAP SERPL CALC-SCNC: 8 MMOL/L (ref 0–11.9)
AST SERPL-CCNC: 29 U/L (ref 12–45)
BILIRUB SERPL-MCNC: 1.6 MG/DL (ref 0.1–1.5)
BUN SERPL-MCNC: 9 MG/DL (ref 8–22)
CALCIUM SERPL-MCNC: 9.6 MG/DL (ref 8.5–10.5)
CEA SERPL-MCNC: 1.4 NG/ML (ref 0–3)
CHLORIDE SERPL-SCNC: 105 MMOL/L (ref 96–112)
CO2 SERPL-SCNC: 27 MMOL/L (ref 20–33)
CREAT SERPL-MCNC: 0.64 MG/DL (ref 0.5–1.4)
GLOBULIN SER CALC-MCNC: 2.1 G/DL (ref 1.9–3.5)
GLUCOSE SERPL-MCNC: 80 MG/DL (ref 65–99)
POTASSIUM SERPL-SCNC: 3.5 MMOL/L (ref 3.6–5.5)
PROT SERPL-MCNC: 6.5 G/DL (ref 6–8.2)
SODIUM SERPL-SCNC: 140 MMOL/L (ref 135–145)

## 2019-04-30 PROCEDURE — 80053 COMPREHEN METABOLIC PANEL: CPT

## 2019-04-30 PROCEDURE — 82378 CARCINOEMBRYONIC ANTIGEN: CPT

## 2019-05-01 ENCOUNTER — APPOINTMENT (OUTPATIENT)
Dept: RADIOLOGY | Facility: MEDICAL CENTER | Age: 54
End: 2019-05-01
Attending: INTERNAL MEDICINE
Payer: COMMERCIAL

## 2019-05-05 ENCOUNTER — HOSPITAL ENCOUNTER (OUTPATIENT)
Dept: RADIOLOGY | Facility: MEDICAL CENTER | Age: 54
End: 2019-05-05
Attending: INTERNAL MEDICINE
Payer: COMMERCIAL

## 2019-05-05 DIAGNOSIS — C7A.1 MALIGNANT POORLY DIFFERENTIATED NEUROENDOCRINE CARCINOMA (HCC): ICD-10-CM

## 2019-05-05 DIAGNOSIS — C18.9 MALIGNANT NEOPLASM OF COLON, UNSPECIFIED PART OF COLON (HCC): ICD-10-CM

## 2019-05-05 PROCEDURE — A9585 GADOBUTROL INJECTION: HCPCS | Performed by: INTERNAL MEDICINE

## 2019-05-05 PROCEDURE — 70553 MRI BRAIN STEM W/O & W/DYE: CPT

## 2019-05-05 PROCEDURE — 700117 HCHG RX CONTRAST REV CODE 255: Performed by: INTERNAL MEDICINE

## 2019-05-05 RX ORDER — GADOBUTROL 604.72 MG/ML
INJECTION INTRAVENOUS ONCE
OUTPATIENT
Start: 2019-05-05 | End: 2019-05-05

## 2019-05-05 RX ORDER — GADOBUTROL 604.72 MG/ML
5 INJECTION INTRAVENOUS ONCE
Status: COMPLETED | OUTPATIENT
Start: 2019-05-05 | End: 2019-05-05

## 2019-05-05 RX ADMIN — GADOBUTROL 5 ML: 604.72 INJECTION INTRAVENOUS at 17:27

## 2019-05-18 ENCOUNTER — HOSPITAL ENCOUNTER (OUTPATIENT)
Dept: RADIOLOGY | Facility: MEDICAL CENTER | Age: 54
End: 2019-05-18
Attending: INTERNAL MEDICINE
Payer: COMMERCIAL

## 2019-05-18 DIAGNOSIS — C7A.1 MALIGNANT POORLY DIFFERENTIATED NEUROENDOCRINE CARCINOMA (HCC): ICD-10-CM

## 2019-05-18 DIAGNOSIS — C18.9 MALIGNANT NEOPLASM OF COLON, UNSPECIFIED PART OF COLON (HCC): ICD-10-CM

## 2019-05-18 PROCEDURE — 71260 CT THORAX DX C+: CPT

## 2019-05-18 PROCEDURE — 700117 HCHG RX CONTRAST REV CODE 255: Performed by: INTERNAL MEDICINE

## 2019-05-18 RX ADMIN — IOHEXOL 100 ML: 350 INJECTION, SOLUTION INTRAVENOUS at 10:45

## 2019-05-18 RX ADMIN — IOHEXOL 25 ML: 240 INJECTION, SOLUTION INTRATHECAL; INTRAVASCULAR; INTRAVENOUS; ORAL at 10:46

## 2019-06-07 ENCOUNTER — APPOINTMENT (OUTPATIENT)
Dept: RADIOLOGY | Facility: MEDICAL CENTER | Age: 54
End: 2019-06-07
Attending: INTERNAL MEDICINE
Payer: COMMERCIAL

## 2019-06-25 ENCOUNTER — HOSPITAL ENCOUNTER (OUTPATIENT)
Dept: RADIOLOGY | Facility: MEDICAL CENTER | Age: 54
End: 2019-06-25
Attending: INTERNAL MEDICINE
Payer: COMMERCIAL

## 2019-06-25 DIAGNOSIS — C7A.1 MALIGNANT POORLY DIFFERENTIATED NEUROENDOCRINE CARCINOMA (HCC): ICD-10-CM

## 2019-06-25 DIAGNOSIS — C18.9 MALIGNANT NEOPLASM OF COLON, UNSPECIFIED PART OF COLON (HCC): ICD-10-CM

## 2019-06-25 PROCEDURE — 76642 ULTRASOUND BREAST LIMITED: CPT | Mod: LT

## 2019-07-09 ENCOUNTER — APPOINTMENT (OUTPATIENT)
Dept: RADIOLOGY | Facility: MEDICAL CENTER | Age: 54
End: 2019-07-09
Attending: INTERNAL MEDICINE
Payer: COMMERCIAL

## 2019-07-09 DIAGNOSIS — C18.9 MALIGNANT NEOPLASM OF COLON, UNSPECIFIED PART OF COLON (HCC): ICD-10-CM

## 2019-07-09 PROCEDURE — A9552 F18 FDG: HCPCS

## 2019-07-10 ENCOUNTER — HOSPITAL ENCOUNTER (OUTPATIENT)
Dept: RADIATION ONCOLOGY | Facility: MEDICAL CENTER | Age: 54
End: 2019-07-31
Attending: RADIOLOGY
Payer: COMMERCIAL

## 2019-07-10 VITALS
SYSTOLIC BLOOD PRESSURE: 131 MMHG | DIASTOLIC BLOOD PRESSURE: 81 MMHG | HEART RATE: 58 BPM | WEIGHT: 109 LBS | OXYGEN SATURATION: 100 % | BODY MASS INDEX: 19.31 KG/M2 | TEMPERATURE: 97.6 F

## 2019-07-10 DIAGNOSIS — C18.9 MALIGNANT NEOPLASM OF COLON, UNSPECIFIED PART OF COLON (HCC): ICD-10-CM

## 2019-07-10 DIAGNOSIS — C79.51 BONE METASTASIS: ICD-10-CM

## 2019-07-10 PROCEDURE — 77290 THER RAD SIMULAJ FIELD CPLX: CPT | Performed by: RADIOLOGY

## 2019-07-10 PROCEDURE — 77290 THER RAD SIMULAJ FIELD CPLX: CPT | Mod: 26 | Performed by: RADIOLOGY

## 2019-07-10 PROCEDURE — 77263 THER RADIOLOGY TX PLNG CPLX: CPT | Performed by: RADIOLOGY

## 2019-07-10 PROCEDURE — 99212 OFFICE O/P EST SF 10 MIN: CPT | Mod: 25 | Performed by: RADIOLOGY

## 2019-07-10 PROCEDURE — 77334 RADIATION TREATMENT AID(S): CPT | Performed by: RADIOLOGY

## 2019-07-10 PROCEDURE — 99215 OFFICE O/P EST HI 40 MIN: CPT | Mod: 25 | Performed by: RADIOLOGY

## 2019-07-10 PROCEDURE — 77334 RADIATION TREATMENT AID(S): CPT | Mod: 26 | Performed by: RADIOLOGY

## 2019-07-10 RX ORDER — HYDROMORPHONE HYDROCHLORIDE 2 MG/1
2-4 TABLET ORAL EVERY 4 HOURS PRN
Qty: 42 TAB | Refills: 0 | Status: SHIPPED | OUTPATIENT
Start: 2019-07-10 | End: 2019-07-17

## 2019-07-10 RX ORDER — IBUPROFEN 200 MG
200 TABLET ORAL EVERY 6 HOURS PRN
COMMUNITY

## 2019-07-10 ASSESSMENT — PAIN SCALES - GENERAL
PAINLEVEL: 5=MODERATE PAIN
PAINLEVEL: 5=MODERATE PAIN

## 2019-07-10 NOTE — PROGRESS NOTES
RADIATION ONCOLOGY FOLLOW-UP    DATE OF SERVICE: 7/10/2019    IDENTIFICATION:   A 53 y.o. with metastatic, stage IV,  colon cancer with neuroendocrine features.  Initially treated with systemic therapy for colon cancer with response.  She subsequently developed chest disease which on biopsy demonstrated neuroendocrine differentiation.  She was treated with cisplatin etoposide chemotherapy with complete response.    HISTORY OF PRESENT ILLNESS:   Patient was seen in consult January 2019 after complete response to cisplatin and etoposide chemotherapy as described above.  Decision was made to take a wait-and-see approach.  Approximately 3 weeks ago patient began to experience pain in the left parasternal region.  PET CT scan was obtained demonstrating recurrence of metastatic disease in the left parasternal region as well as at this point disease in the liver, and proximal pancreatic body.  She is quite symptomatic with the sternal disease which is causing pain with any movement of her arms or even breathing.  Pain is described as being sharp.  Pain is approximately 5 on a scale of 0-10.  She is requesting analgesics.  There appears to be no radiation of pain.    CURRENT MEDICATIONS:  Current Outpatient Prescriptions   Medication Sig Dispense Refill   • ibuprofen (MOTRIN) 200 MG Tab Take 200 mg by mouth every 6 hours as needed.     • ALPRAZolam (XANAX) 0.25 MG Tab Take 1 Tab by mouth 3 times a day as needed for Anxiety for up to 183 days. 90 Tab 5   • ondansetron (ZOFRAN) 8 MG Tab      • oxycodone immediate-release (ROXICODONE) 5 MG Tab Take 5 mg by mouth every four hours as needed for Severe Pain.     • docusate sodium (COLACE) 100 MG Cap Take 100 mg by mouth as needed for Constipation.       No current facility-administered medications for this encounter.        ALLERGIES:  Pcn [penicillins]; Lexapro; Paxil [paroxetine]; and Zoloft    REVIEW OF SYSTEMS:  A review of systems for today's date of service was reviewed and  uploaded into the electronic medical record.    PHYSICAL EXAM:   ECOG PERFORMANCE STATUS:  0= Fully active, able to carry on all pre-disease performance without restriction.  /81   Pulse (!) 58   Temp 36.4 °C (97.6 °F)   Wt 49.4 kg (109 lb)   SpO2 100%   BMI 19.31 kg/m²   GENERAL: Well-developed, well-nourished, alert, oriented female no acute distress  HEENT:  Pupils are equal, round, and reactive to light.  Extraocular muscles   are intact. Sclerae nonicteric.  Conjunctivae pink.  Oral cavity, tongue   protrudes midline.   NECK:  Supple without evidence of thyromegaly.  NODES:  No peripheral adenopathy of the neck, supraclavicular fossa or axillae   bilaterally.  CHEST: Moderate to severe tenderness to palpation of the left mid sternal region  LUNGS:  Clear to ascultation and resonant to percussion.  HEART:  Regular rate and rhythm.  No murmur appreciated  ABDOMEN:  Soft. No evidence of hepatosplenomegaly.  Positive bowel sounds.  EXTREMITIES:  Without Edema.  NEUROLOGIC:  Cranial nerves II through XII were intact.  Strength is 5/5 in   lower extremities bilaterally.   There was no focal sensory deficit appreciated.      RADIOLOGY DATA:  Fn-iiusi-[id number] Base To Mid-thigh    Result Date: 7/9/2019 7/9/2019 10:19 AM HISTORY/REASON FOR EXAM:  History of colon cancer TECHNIQUE/EXAM DESCRIPTION AND NUMBER OF VIEWS: PET body imaging. Initially, 14.9 mCi F-18 FDG was administered intravenously under standardized conditions. Approximately 45 minutes after FDG administration, the patient was placed in the supine position on the PET CT table. Blood glucose level was 78 mg/dL. Low dose spiral CT imaging was performed from the skull base to the mid thighs. PET imaging was then performed from the skull base to the mid thighs. CT images, PET images, and PET/CT fused images were reviewed on a PACS 3D workstation. The limited non-contrast CT data are used primarily for attenuation correction and anatomic correlation.   Evaluation of solid organs and bowel are especially limited utilizing this technique. COMPARISON: 5/18/2019. FINDINGS: Head and neck: No cervical lymphadenopathy is identified. Chest: There is a left-sided chest port. There is mild biapical scarring. There is a soft tissue nodule to the left the sternum measuring 9 mm (maximum SUV 9.19) with linear extension to the skin surface. There is also nodular extension inferiorly along the internal mammary chain. This is metabolically active and compatible with metastatic disease. Abdomen and pelvis: There is a hypodense lesion within the right lobe of the liver measuring 2.3 cm which is hypermetabolic (maximum SUV 23.6). There is a small hypermetabolic lesion in the peripheral inferior right lobe of the liver (maximum SUV 4.92). There are posttreatment changes of the liver. Gallbladder is surgically absent. There is mild atherosclerotic plaque. There is a moderate amount of colonic stool. Postsurgical changes are seen in the right abdomen. Focus of uptake in the left pelvis is likely related to the distal left ureter. There is uptake in the region of the proximal pancreatic body demonstrating a maximum SUV of 7.49. There are postsurgical changes of the proximal left femur. There is focal uptake at the level of the intervertebral disc at C6/C7 which is likely degenerative     Left parasternal lesion which extends inferiorly along the internal mammary chain and has superficial extension towards the skin surface. This is hypermetabolic and compatible with metastatic disease. Focal uptake in in the region of the proximal pancreatic body is compatible with metastatic disease. Hepatic lesions compatible with metastatic disease. Uptake at C6/C7 is likely degenerative.      IMPRESSION:    A 53 y.o. with stage IV colon cancer with neuroendocrine features.  Progression of disease in the left parasternal region as well as liver and pancreas.    RECOMMENDATIONS:   Reviewed imaging with  patient.  Did recommend palliative radiotherapy to the left parasternal region which will involve delivering 2500 cGy in 5 fractions of 5 Gy.  This showed results in good control of her pain.  We will also start her on Dilaudid for pain control.  Technical aspects benefits risks associate with radiotherapy were reviewed.  She understands and would like to proceed.  We will begin the simulation process today with treatment anticipated to start July 12 and complete July 18.    Thank you for the opportunity to participate in her care.  If any questions or comments, please do not hesitate in calling.      Hayley FRANCISCO M.D.  Electronically signed by: Hayley Calvert V, 7/10/2019 4:26 PM  [phone]

## 2019-07-11 PROCEDURE — 77295 3-D RADIOTHERAPY PLAN: CPT | Performed by: RADIOLOGY

## 2019-07-11 PROCEDURE — 77300 RADIATION THERAPY DOSE PLAN: CPT | Performed by: RADIOLOGY

## 2019-07-11 PROCEDURE — 77295 3-D RADIOTHERAPY PLAN: CPT | Mod: 26 | Performed by: RADIOLOGY

## 2019-07-11 PROCEDURE — 77334 RADIATION TREATMENT AID(S): CPT | Performed by: RADIOLOGY

## 2019-07-11 PROCEDURE — 77334 RADIATION TREATMENT AID(S): CPT | Mod: 26 | Performed by: RADIOLOGY

## 2019-07-11 PROCEDURE — 77300 RADIATION THERAPY DOSE PLAN: CPT | Mod: 26 | Performed by: RADIOLOGY

## 2019-07-12 ENCOUNTER — APPOINTMENT (OUTPATIENT)
Dept: RADIOLOGY | Facility: MEDICAL CENTER | Age: 54
End: 2019-07-12
Attending: INTERNAL MEDICINE
Payer: COMMERCIAL

## 2019-07-12 ENCOUNTER — HOSPITAL ENCOUNTER (OUTPATIENT)
Dept: RADIATION ONCOLOGY | Facility: MEDICAL CENTER | Age: 54
End: 2019-07-12

## 2019-07-12 LAB
CHEMOTHERAPY INFUSION START DATE: NORMAL
CHEMOTHERAPY RECORDS: 2500
CHEMOTHERAPY RECORDS: 5
CHEMOTHERAPY RECORDS: NORMAL
CHEMOTHERAPY RX CANCER: NORMAL
RAD ONC ARIA COURSE TREATMENT ELAPSED DAYS: NORMAL
RAD ONC ARIA PLAN TREATMENT DATES: NORMAL
RAD ONC ARIA REFERENCE POINT DOSAGE GIVEN TO DATE: 4.86
RAD ONC ARIA REFERENCE POINT DOSAGE GIVEN TO DATE: 5
RAD ONC ARIA REFERENCE POINT DOSAGE GIVEN TO DATE: 5.1
RAD ONC ARIA REFERENCE POINT ID: NORMAL
RAD ONC ARIA REFERENCE POINT SESSION DOSAGE GIVEN: 4.86
RAD ONC ARIA REFERENCE POINT SESSION DOSAGE GIVEN: 5
RAD ONC ARIA REFERENCE POINT SESSION DOSAGE GIVEN: 5.1

## 2019-07-12 PROCEDURE — 77412 RADIATION TX DELIVERY LVL 3: CPT | Performed by: RADIOLOGY

## 2019-07-12 PROCEDURE — 77280 THER RAD SIMULAJ FIELD SMPL: CPT | Performed by: RADIOLOGY

## 2019-07-12 PROCEDURE — 77280 THER RAD SIMULAJ FIELD SMPL: CPT | Mod: 26 | Performed by: RADIOLOGY

## 2019-07-15 ENCOUNTER — HOSPITAL ENCOUNTER (OUTPATIENT)
Dept: RADIATION ONCOLOGY | Facility: MEDICAL CENTER | Age: 54
End: 2019-07-15

## 2019-07-15 LAB
CHEMOTHERAPY INFUSION START DATE: NORMAL
CHEMOTHERAPY RECORDS: 2500
CHEMOTHERAPY RECORDS: 5
CHEMOTHERAPY RECORDS: NORMAL
CHEMOTHERAPY RX CANCER: NORMAL
RAD ONC ARIA COURSE TREATMENT ELAPSED DAYS: NORMAL
RAD ONC ARIA PLAN TREATMENT DATES: NORMAL
RAD ONC ARIA REFERENCE POINT DOSAGE GIVEN TO DATE: 10
RAD ONC ARIA REFERENCE POINT DOSAGE GIVEN TO DATE: 10.2
RAD ONC ARIA REFERENCE POINT DOSAGE GIVEN TO DATE: 9.71
RAD ONC ARIA REFERENCE POINT ID: NORMAL
RAD ONC ARIA REFERENCE POINT SESSION DOSAGE GIVEN: 4.86
RAD ONC ARIA REFERENCE POINT SESSION DOSAGE GIVEN: 5
RAD ONC ARIA REFERENCE POINT SESSION DOSAGE GIVEN: 5.1

## 2019-07-15 PROCEDURE — 77412 RADIATION TX DELIVERY LVL 3: CPT | Performed by: RADIOLOGY

## 2019-07-15 PROCEDURE — 77387 GUIDANCE FOR RADJ TX DLVR: CPT | Performed by: RADIOLOGY

## 2019-07-15 PROCEDURE — 77014 PR CT GUIDANCE PLACEMENT RAD THERAPY FIELDS: CPT | Mod: 26 | Performed by: RADIOLOGY

## 2019-07-16 ENCOUNTER — HOSPITAL ENCOUNTER (OUTPATIENT)
Dept: RADIATION ONCOLOGY | Facility: MEDICAL CENTER | Age: 54
End: 2019-07-16

## 2019-07-16 LAB
CHEMOTHERAPY INFUSION START DATE: NORMAL
CHEMOTHERAPY RECORDS: 2500
CHEMOTHERAPY RECORDS: 5
CHEMOTHERAPY RECORDS: NORMAL
CHEMOTHERAPY RX CANCER: NORMAL
RAD ONC ARIA COURSE TREATMENT ELAPSED DAYS: NORMAL
RAD ONC ARIA PLAN TREATMENT DATES: NORMAL
RAD ONC ARIA REFERENCE POINT DOSAGE GIVEN TO DATE: 14.57
RAD ONC ARIA REFERENCE POINT DOSAGE GIVEN TO DATE: 15
RAD ONC ARIA REFERENCE POINT DOSAGE GIVEN TO DATE: 15.31
RAD ONC ARIA REFERENCE POINT ID: NORMAL
RAD ONC ARIA REFERENCE POINT SESSION DOSAGE GIVEN: 4.86
RAD ONC ARIA REFERENCE POINT SESSION DOSAGE GIVEN: 5
RAD ONC ARIA REFERENCE POINT SESSION DOSAGE GIVEN: 5.1

## 2019-07-16 PROCEDURE — 77387 GUIDANCE FOR RADJ TX DLVR: CPT | Performed by: RADIOLOGY

## 2019-07-16 PROCEDURE — 77336 RADIATION PHYSICS CONSULT: CPT | Performed by: RADIOLOGY

## 2019-07-16 PROCEDURE — 77412 RADIATION TX DELIVERY LVL 3: CPT | Performed by: RADIOLOGY

## 2019-07-16 PROCEDURE — 77014 PR CT GUIDANCE PLACEMENT RAD THERAPY FIELDS: CPT | Mod: 26 | Performed by: RADIOLOGY

## 2019-07-17 LAB
CHEMOTHERAPY INFUSION START DATE: NORMAL
CHEMOTHERAPY RECORDS: 2500
CHEMOTHERAPY RECORDS: 5
CHEMOTHERAPY RECORDS: NORMAL
CHEMOTHERAPY RX CANCER: NORMAL
RAD ONC ARIA COURSE TREATMENT ELAPSED DAYS: NORMAL
RAD ONC ARIA PLAN TREATMENT DATES: NORMAL
RAD ONC ARIA REFERENCE POINT DOSAGE GIVEN TO DATE: 19.43
RAD ONC ARIA REFERENCE POINT DOSAGE GIVEN TO DATE: 20
RAD ONC ARIA REFERENCE POINT DOSAGE GIVEN TO DATE: 20.41
RAD ONC ARIA REFERENCE POINT ID: NORMAL
RAD ONC ARIA REFERENCE POINT SESSION DOSAGE GIVEN: 4.86
RAD ONC ARIA REFERENCE POINT SESSION DOSAGE GIVEN: 5
RAD ONC ARIA REFERENCE POINT SESSION DOSAGE GIVEN: 5.1

## 2019-07-17 PROCEDURE — 77412 RADIATION TX DELIVERY LVL 3: CPT | Performed by: RADIOLOGY

## 2019-07-17 PROCEDURE — 77331 SPECIAL RADIATION DOSIMETRY: CPT | Mod: 26 | Performed by: RADIOLOGY

## 2019-07-17 PROCEDURE — 77014 PR CT GUIDANCE PLACEMENT RAD THERAPY FIELDS: CPT | Mod: 26 | Performed by: RADIOLOGY

## 2019-07-17 PROCEDURE — 77331 SPECIAL RADIATION DOSIMETRY: CPT | Performed by: RADIOLOGY

## 2019-07-17 PROCEDURE — 77387 GUIDANCE FOR RADJ TX DLVR: CPT | Performed by: RADIOLOGY

## 2019-07-18 ENCOUNTER — HOSPITAL ENCOUNTER (OUTPATIENT)
Dept: RADIATION ONCOLOGY | Facility: MEDICAL CENTER | Age: 54
End: 2019-07-18

## 2019-07-18 ENCOUNTER — HOSPITAL ENCOUNTER (OUTPATIENT)
Dept: LAB | Facility: MEDICAL CENTER | Age: 54
End: 2019-07-18
Attending: INTERNAL MEDICINE
Payer: COMMERCIAL

## 2019-07-18 LAB
ALBUMIN SERPL BCP-MCNC: 4.6 G/DL (ref 3.2–4.9)
ALBUMIN/GLOB SERPL: 1.9 G/DL
ALP SERPL-CCNC: 68 U/L (ref 30–99)
ALT SERPL-CCNC: 30 U/L (ref 2–50)
ANION GAP SERPL CALC-SCNC: 11 MMOL/L (ref 0–11.9)
AST SERPL-CCNC: 30 U/L (ref 12–45)
BILIRUB SERPL-MCNC: 1.7 MG/DL (ref 0.1–1.5)
BUN SERPL-MCNC: 9 MG/DL (ref 8–22)
CALCIUM SERPL-MCNC: 10 MG/DL (ref 8.5–10.5)
CHEMOTHERAPY INFUSION START DATE: NORMAL
CHEMOTHERAPY RECORDS: 2500
CHEMOTHERAPY RECORDS: 5
CHEMOTHERAPY RECORDS: NORMAL
CHEMOTHERAPY RX CANCER: NORMAL
CHLORIDE SERPL-SCNC: 105 MMOL/L (ref 96–112)
CO2 SERPL-SCNC: 27 MMOL/L (ref 20–33)
CREAT SERPL-MCNC: 0.64 MG/DL (ref 0.5–1.4)
GLOBULIN SER CALC-MCNC: 2.4 G/DL (ref 1.9–3.5)
GLUCOSE SERPL-MCNC: 60 MG/DL (ref 65–99)
POTASSIUM SERPL-SCNC: 4.1 MMOL/L (ref 3.6–5.5)
PROT SERPL-MCNC: 7 G/DL (ref 6–8.2)
RAD ONC ARIA COURSE TREATMENT ELAPSED DAYS: NORMAL
RAD ONC ARIA PLAN TREATMENT DATES: NORMAL
RAD ONC ARIA REFERENCE POINT DOSAGE GIVEN TO DATE: 24.28
RAD ONC ARIA REFERENCE POINT DOSAGE GIVEN TO DATE: 25
RAD ONC ARIA REFERENCE POINT DOSAGE GIVEN TO DATE: 25.51
RAD ONC ARIA REFERENCE POINT ID: NORMAL
RAD ONC ARIA REFERENCE POINT SESSION DOSAGE GIVEN: 4.86
RAD ONC ARIA REFERENCE POINT SESSION DOSAGE GIVEN: 5
RAD ONC ARIA REFERENCE POINT SESSION DOSAGE GIVEN: 5.1
SODIUM SERPL-SCNC: 143 MMOL/L (ref 135–145)

## 2019-07-18 PROCEDURE — 77427 RADIATION TX MANAGEMENT X5: CPT | Performed by: RADIOLOGY

## 2019-07-18 PROCEDURE — 80053 COMPREHEN METABOLIC PANEL: CPT

## 2019-07-18 PROCEDURE — 77014 PR CT GUIDANCE PLACEMENT RAD THERAPY FIELDS: CPT | Mod: 26 | Performed by: RADIOLOGY

## 2019-07-18 PROCEDURE — 77412 RADIATION TX DELIVERY LVL 3: CPT | Performed by: RADIOLOGY

## 2019-07-18 PROCEDURE — 77387 GUIDANCE FOR RADJ TX DLVR: CPT | Performed by: RADIOLOGY

## 2019-07-22 LAB
CHEMOTHERAPY INFUSION START DATE: NORMAL
CHEMOTHERAPY RECORDS: 2500
CHEMOTHERAPY RECORDS: 5
CHEMOTHERAPY RECORDS: NORMAL
CHEMOTHERAPY RX CANCER: NORMAL
RAD ONC ARIA COURSE TREATMENT ELAPSED DAYS: NORMAL
RAD ONC ARIA PLAN TREATMENT DATES: NORMAL
RAD ONC ARIA REFERENCE POINT DOSAGE GIVEN TO DATE: 24.28
RAD ONC ARIA REFERENCE POINT DOSAGE GIVEN TO DATE: 25
RAD ONC ARIA REFERENCE POINT DOSAGE GIVEN TO DATE: 25.51
RAD ONC ARIA REFERENCE POINT ID: NORMAL

## 2019-08-13 ENCOUNTER — HOSPITAL ENCOUNTER (OUTPATIENT)
Dept: LAB | Facility: MEDICAL CENTER | Age: 54
End: 2019-08-13
Attending: INTERNAL MEDICINE
Payer: COMMERCIAL

## 2019-08-13 LAB
ALBUMIN SERPL BCP-MCNC: 4.6 G/DL (ref 3.2–4.9)
ALBUMIN/GLOB SERPL: 2.2 G/DL
ALP SERPL-CCNC: 106 U/L (ref 30–99)
ALT SERPL-CCNC: 64 U/L (ref 2–50)
ANION GAP SERPL CALC-SCNC: 8 MMOL/L (ref 0–11.9)
AST SERPL-CCNC: 39 U/L (ref 12–45)
BILIRUB SERPL-MCNC: 1.3 MG/DL (ref 0.1–1.5)
BUN SERPL-MCNC: 9 MG/DL (ref 8–22)
CALCIUM SERPL-MCNC: 9.3 MG/DL (ref 8.5–10.5)
CHLORIDE SERPL-SCNC: 109 MMOL/L (ref 96–112)
CO2 SERPL-SCNC: 26 MMOL/L (ref 20–33)
CREAT SERPL-MCNC: 0.64 MG/DL (ref 0.5–1.4)
GLOBULIN SER CALC-MCNC: 2.1 G/DL (ref 1.9–3.5)
GLUCOSE SERPL-MCNC: 89 MG/DL (ref 65–99)
POTASSIUM SERPL-SCNC: 3.7 MMOL/L (ref 3.6–5.5)
PROT SERPL-MCNC: 6.7 G/DL (ref 6–8.2)
SODIUM SERPL-SCNC: 143 MMOL/L (ref 135–145)

## 2019-08-13 PROCEDURE — 80053 COMPREHEN METABOLIC PANEL: CPT

## 2020-01-01 ENCOUNTER — HOSPITAL ENCOUNTER (OUTPATIENT)
Dept: RADIOLOGY | Facility: MEDICAL CENTER | Age: 55
End: 2020-04-21
Attending: INTERNAL MEDICINE
Payer: COMMERCIAL

## 2020-01-01 ENCOUNTER — APPOINTMENT (OUTPATIENT)
Dept: RADIOLOGY | Facility: MEDICAL CENTER | Age: 55
End: 2020-01-01
Attending: INTERNAL MEDICINE
Payer: COMMERCIAL

## 2020-01-01 ENCOUNTER — HOSPITAL ENCOUNTER (OUTPATIENT)
Dept: RADIOLOGY | Facility: MEDICAL CENTER | Age: 55
End: 2020-03-21
Attending: INTERNAL MEDICINE
Payer: COMMERCIAL

## 2020-01-01 ENCOUNTER — ANESTHESIA EVENT (OUTPATIENT)
Dept: SURGERY | Facility: MEDICAL CENTER | Age: 55
End: 2020-01-01
Payer: COMMERCIAL

## 2020-01-01 ENCOUNTER — HOSPITAL ENCOUNTER (OUTPATIENT)
Dept: RADIOLOGY | Facility: MEDICAL CENTER | Age: 55
End: 2020-04-10
Attending: INTERNAL MEDICINE
Payer: COMMERCIAL

## 2020-01-01 ENCOUNTER — APPOINTMENT (OUTPATIENT)
Dept: RADIOLOGY | Facility: MEDICAL CENTER | Age: 55
End: 2020-01-01
Attending: RADIOLOGY
Payer: COMMERCIAL

## 2020-01-01 ENCOUNTER — HOSPITAL ENCOUNTER (OUTPATIENT)
Facility: MEDICAL CENTER | Age: 55
End: 2020-01-01
Attending: INTERNAL MEDICINE | Admitting: INTERNAL MEDICINE
Payer: COMMERCIAL

## 2020-01-01 ENCOUNTER — HOSPITAL ENCOUNTER (OUTPATIENT)
Facility: MEDICAL CENTER | Age: 55
End: 2020-06-19
Attending: RADIOLOGY | Admitting: RADIOLOGY
Payer: COMMERCIAL

## 2020-01-01 ENCOUNTER — HOSPITAL ENCOUNTER (OUTPATIENT)
Dept: LAB | Facility: MEDICAL CENTER | Age: 55
End: 2020-05-12
Attending: NURSE PRACTITIONER
Payer: COMMERCIAL

## 2020-01-01 ENCOUNTER — HOSPITAL ENCOUNTER (OUTPATIENT)
Facility: MEDICAL CENTER | Age: 55
End: 2020-05-01
Attending: INTERNAL MEDICINE | Admitting: INTERNAL MEDICINE
Payer: COMMERCIAL

## 2020-01-01 ENCOUNTER — HOSPITAL ENCOUNTER (OUTPATIENT)
Dept: RADIOLOGY | Facility: MEDICAL CENTER | Age: 55
End: 2020-01-19
Attending: INTERNAL MEDICINE
Payer: COMMERCIAL

## 2020-01-01 ENCOUNTER — HOSPITAL ENCOUNTER (OUTPATIENT)
Dept: RADIOLOGY | Facility: MEDICAL CENTER | Age: 55
End: 2020-05-21
Attending: INTERNAL MEDICINE
Payer: COMMERCIAL

## 2020-01-01 ENCOUNTER — HOSPITAL ENCOUNTER (OUTPATIENT)
Dept: RADIOLOGY | Facility: MEDICAL CENTER | Age: 55
End: 2020-05-23
Attending: NURSE PRACTITIONER
Payer: COMMERCIAL

## 2020-01-01 ENCOUNTER — ANESTHESIA (OUTPATIENT)
Dept: SURGERY | Facility: MEDICAL CENTER | Age: 55
End: 2020-01-01
Payer: COMMERCIAL

## 2020-01-01 VITALS
HEIGHT: 63 IN | BODY MASS INDEX: 17.19 KG/M2 | RESPIRATION RATE: 14 BRPM | TEMPERATURE: 99 F | DIASTOLIC BLOOD PRESSURE: 64 MMHG | SYSTOLIC BLOOD PRESSURE: 105 MMHG | OXYGEN SATURATION: 98 % | HEART RATE: 81 BPM | WEIGHT: 97 LBS

## 2020-01-01 VITALS
RESPIRATION RATE: 18 BRPM | HEIGHT: 63 IN | TEMPERATURE: 99.1 F | DIASTOLIC BLOOD PRESSURE: 85 MMHG | OXYGEN SATURATION: 98 % | HEART RATE: 94 BPM | WEIGHT: 108.25 LBS | BODY MASS INDEX: 19.18 KG/M2 | SYSTOLIC BLOOD PRESSURE: 133 MMHG

## 2020-01-01 DIAGNOSIS — C18.9 MALIGNANT NEOPLASM OF COLON, UNSPECIFIED PART OF COLON (HCC): ICD-10-CM

## 2020-01-01 DIAGNOSIS — C7B.8 NEUROENDOCRINE CARCINOMA METASTATIC TO LIVER (HCC): ICD-10-CM

## 2020-01-01 DIAGNOSIS — C7A.8 NEUROENDOCRINE CARCINOMA METASTATIC TO LIVER (HCC): ICD-10-CM

## 2020-01-01 DIAGNOSIS — C78.1 SECONDARY MALIGNANT NEOPLASM OF MEDIASTINUM (HCC): ICD-10-CM

## 2020-01-01 DIAGNOSIS — E80.4 GILBERT DISEASE: ICD-10-CM

## 2020-01-01 DIAGNOSIS — C7A.1 MALIGNANT POORLY DIFFERENTIATED NEUROENDOCRINE CARCINOMA (HCC): ICD-10-CM

## 2020-01-01 DIAGNOSIS — C18.8 OVERLAPPING MALIGNANT NEOPLASM OF COLON (HCC): ICD-10-CM

## 2020-01-01 LAB
ALBUMIN SERPL BCP-MCNC: 4.1 G/DL (ref 3.2–4.9)
ALBUMIN SERPL BCP-MCNC: 4.5 G/DL (ref 3.2–4.9)
ALBUMIN/GLOB SERPL: 1.4 G/DL
ALBUMIN/GLOB SERPL: 2 G/DL
ALP SERPL-CCNC: 267 U/L (ref 30–99)
ALP SERPL-CCNC: 398 U/L (ref 30–99)
ALT SERPL-CCNC: 22 U/L (ref 2–50)
ALT SERPL-CCNC: 66 U/L (ref 2–50)
ANION GAP SERPL CALC-SCNC: 10 MMOL/L (ref 7–16)
ANION GAP SERPL CALC-SCNC: 12 MMOL/L (ref 7–16)
ANION GAP SERPL CALC-SCNC: 13 MMOL/L (ref 7–16)
AST SERPL-CCNC: 33 U/L (ref 12–45)
AST SERPL-CCNC: 93 U/L (ref 12–45)
BASOPHILS # BLD AUTO: 0.5 % (ref 0–1.8)
BASOPHILS # BLD: 0.02 K/UL (ref 0–0.12)
BILIRUB SERPL-MCNC: 0.6 MG/DL (ref 0.1–1.5)
BILIRUB SERPL-MCNC: 1.1 MG/DL (ref 0.1–1.5)
BUN SERPL-MCNC: 8 MG/DL (ref 8–22)
BUN SERPL-MCNC: 9 MG/DL (ref 8–22)
BUN SERPL-MCNC: 9 MG/DL (ref 8–22)
CALCIUM SERPL-MCNC: 10.1 MG/DL (ref 8.4–10.2)
CALCIUM SERPL-MCNC: 10.2 MG/DL (ref 8.5–10.5)
CALCIUM SERPL-MCNC: 9.9 MG/DL (ref 8.5–10.5)
CHLORIDE SERPL-SCNC: 100 MMOL/L (ref 96–112)
CHLORIDE SERPL-SCNC: 98 MMOL/L (ref 96–112)
CHLORIDE SERPL-SCNC: 99 MMOL/L (ref 96–112)
CO2 SERPL-SCNC: 25 MMOL/L (ref 20–33)
CO2 SERPL-SCNC: 26 MMOL/L (ref 20–33)
CO2 SERPL-SCNC: 27 MMOL/L (ref 20–33)
CREAT SERPL-MCNC: 0.46 MG/DL (ref 0.5–1.4)
CREAT SERPL-MCNC: 0.56 MG/DL (ref 0.5–1.4)
CREAT SERPL-MCNC: 0.6 MG/DL (ref 0.5–1.4)
EOSINOPHIL # BLD AUTO: 0.13 K/UL (ref 0–0.51)
EOSINOPHIL NFR BLD: 2.9 % (ref 0–6.9)
ERYTHROCYTE [DISTWIDTH] IN BLOOD BY AUTOMATED COUNT: 41.6 FL (ref 35.9–50)
ERYTHROCYTE [DISTWIDTH] IN BLOOD BY AUTOMATED COUNT: 42.7 FL (ref 35.9–50)
GLOBULIN SER CALC-MCNC: 2.3 G/DL (ref 1.9–3.5)
GLOBULIN SER CALC-MCNC: 2.9 G/DL (ref 1.9–3.5)
GLUCOSE SERPL-MCNC: 101 MG/DL (ref 65–99)
GLUCOSE SERPL-MCNC: 86 MG/DL (ref 65–99)
GLUCOSE SERPL-MCNC: 88 MG/DL (ref 65–99)
HCT VFR BLD AUTO: 33.4 % (ref 37–47)
HCT VFR BLD AUTO: 40.1 % (ref 37–47)
HGB BLD-MCNC: 10.9 G/DL (ref 12–16)
HGB BLD-MCNC: 13.2 G/DL (ref 12–16)
IMM GRANULOCYTES # BLD AUTO: 0.01 K/UL (ref 0–0.11)
IMM GRANULOCYTES NFR BLD AUTO: 0.2 % (ref 0–0.9)
INR PPP: 0.96 (ref 0.87–1.13)
INR PPP: 0.97 (ref 0.87–1.13)
LYMPHOCYTES # BLD AUTO: 0.75 K/UL (ref 1–4.8)
LYMPHOCYTES NFR BLD: 16.9 % (ref 22–41)
MCH RBC QN AUTO: 32.1 PG (ref 27–33)
MCH RBC QN AUTO: 32.3 PG (ref 27–33)
MCHC RBC AUTO-ENTMCNC: 32.6 G/DL (ref 33.6–35)
MCHC RBC AUTO-ENTMCNC: 32.9 G/DL (ref 33.6–35)
MCV RBC AUTO: 98 FL (ref 81.4–97.8)
MCV RBC AUTO: 98.2 FL (ref 81.4–97.8)
MONOCYTES # BLD AUTO: 0.41 K/UL (ref 0–0.85)
MONOCYTES NFR BLD AUTO: 9.3 % (ref 0–13.4)
NEUTROPHILS # BLD AUTO: 3.11 K/UL (ref 2–7.15)
NEUTROPHILS NFR BLD: 70.2 % (ref 44–72)
NRBC # BLD AUTO: 0 K/UL
NRBC BLD-RTO: 0 /100 WBC
PATHOLOGY CONSULT NOTE: NORMAL
PLATELET # BLD AUTO: 137 K/UL (ref 164–446)
PLATELET # BLD AUTO: 163 K/UL (ref 164–446)
PMV BLD AUTO: 10.8 FL (ref 9–12.9)
PMV BLD AUTO: 9.7 FL (ref 9–12.9)
POTASSIUM SERPL-SCNC: 3.8 MMOL/L (ref 3.6–5.5)
POTASSIUM SERPL-SCNC: 4.2 MMOL/L (ref 3.6–5.5)
POTASSIUM SERPL-SCNC: 4.4 MMOL/L (ref 3.6–5.5)
PROT SERPL-MCNC: 6.8 G/DL (ref 6–8.2)
PROT SERPL-MCNC: 7 G/DL (ref 6–8.2)
PROTHROMBIN TIME: 13 SEC (ref 12–14.6)
PROTHROMBIN TIME: 13.1 SEC (ref 12–14.6)
RBC # BLD AUTO: 3.4 M/UL (ref 4.2–5.4)
RBC # BLD AUTO: 4.09 M/UL (ref 4.2–5.4)
SODIUM SERPL-SCNC: 134 MMOL/L (ref 135–145)
SODIUM SERPL-SCNC: 137 MMOL/L (ref 135–145)
SODIUM SERPL-SCNC: 139 MMOL/L (ref 135–145)
WBC # BLD AUTO: 1 K/UL (ref 4.8–10.8)
WBC # BLD AUTO: 4.4 K/UL (ref 4.8–10.8)

## 2020-01-01 PROCEDURE — 80053 COMPREHEN METABOLIC PANEL: CPT

## 2020-01-01 PROCEDURE — 88172 CYTP DX EVAL FNA 1ST EA SITE: CPT

## 2020-01-01 PROCEDURE — 700111 HCHG RX REV CODE 636 W/ 250 OVERRIDE (IP): Performed by: RADIOLOGY

## 2020-01-01 PROCEDURE — 700117 HCHG RX CONTRAST REV CODE 255: Performed by: INTERNAL MEDICINE

## 2020-01-01 PROCEDURE — 700111 HCHG RX REV CODE 636 W/ 250 OVERRIDE (IP)

## 2020-01-01 PROCEDURE — 700101 HCHG RX REV CODE 250

## 2020-01-01 PROCEDURE — 99153 MOD SED SAME PHYS/QHP EA: CPT

## 2020-01-01 PROCEDURE — 700101 HCHG RX REV CODE 250: Performed by: ANESTHESIOLOGY

## 2020-01-01 PROCEDURE — 88360 TUMOR IMMUNOHISTOCHEM/MANUAL: CPT

## 2020-01-01 PROCEDURE — 88341 IMHCHEM/IMCYTCHM EA ADD ANTB: CPT | Mod: 91

## 2020-01-01 PROCEDURE — 700105 HCHG RX REV CODE 258: Performed by: ANESTHESIOLOGY

## 2020-01-01 PROCEDURE — 85610 PROTHROMBIN TIME: CPT

## 2020-01-01 PROCEDURE — 74160 CT ABDOMEN W/CONTRAST: CPT

## 2020-01-01 PROCEDURE — 500066 HCHG BITE BLOCK, ECT: Performed by: INTERNAL MEDICINE

## 2020-01-01 PROCEDURE — 88342 IMHCHEM/IMCYTCHM 1ST ANTB: CPT

## 2020-01-01 PROCEDURE — 88305 TISSUE EXAM BY PATHOLOGIST: CPT

## 2020-01-01 PROCEDURE — 71260 CT THORAX DX C+: CPT

## 2020-01-01 PROCEDURE — 74170 CT ABD WO CNTRST FLWD CNTRST: CPT

## 2020-01-01 PROCEDURE — 160025 RECOVERY II MINUTES (STATS): Performed by: INTERNAL MEDICINE

## 2020-01-01 PROCEDURE — 88307 TISSUE EXAM BY PATHOLOGIST: CPT

## 2020-01-01 PROCEDURE — 700117 HCHG RX CONTRAST REV CODE 255: Performed by: NURSE PRACTITIONER

## 2020-01-01 PROCEDURE — 37243 VASC EMBOLIZE/OCCLUDE ORGAN: CPT

## 2020-01-01 PROCEDURE — 85027 COMPLETE CBC AUTOMATED: CPT

## 2020-01-01 PROCEDURE — 700111 HCHG RX REV CODE 636 W/ 250 OVERRIDE (IP): Performed by: INTERNAL MEDICINE

## 2020-01-01 PROCEDURE — 160203 HCHG ENDO MINUTES - 1ST 30 MINS LEVEL 4: Performed by: INTERNAL MEDICINE

## 2020-01-01 PROCEDURE — 700105 HCHG RX REV CODE 258: Performed by: NURSE PRACTITIONER

## 2020-01-01 PROCEDURE — 700111 HCHG RX REV CODE 636 W/ 250 OVERRIDE (IP): Performed by: ANESTHESIOLOGY

## 2020-01-01 PROCEDURE — 502240 HCHG MISC OR SUPPLY RC 0272: Performed by: INTERNAL MEDICINE

## 2020-01-01 PROCEDURE — 160208 HCHG ENDO MINUTES - EA ADDL 1 MIN LEVEL 4: Performed by: INTERNAL MEDICINE

## 2020-01-01 PROCEDURE — 160002 HCHG RECOVERY MINUTES (STAT): Performed by: INTERNAL MEDICINE

## 2020-01-01 PROCEDURE — 80048 BASIC METABOLIC PNL TOTAL CA: CPT

## 2020-01-01 PROCEDURE — 88312 SPECIAL STAINS GROUP 1: CPT

## 2020-01-01 PROCEDURE — A9540 TC99M MAA: HCPCS

## 2020-01-01 PROCEDURE — 160035 HCHG PACU - 1ST 60 MINS PHASE I: Performed by: INTERNAL MEDICINE

## 2020-01-01 PROCEDURE — 160046 HCHG PACU - 1ST 60 MINS PHASE II: Performed by: INTERNAL MEDICINE

## 2020-01-01 PROCEDURE — 88313 SPECIAL STAINS GROUP 2: CPT

## 2020-01-01 PROCEDURE — 160048 HCHG OR STATISTICAL LEVEL 1-5: Performed by: INTERNAL MEDICINE

## 2020-01-01 PROCEDURE — A9552 F18 FDG: HCPCS

## 2020-01-01 PROCEDURE — 700117 HCHG RX CONTRAST REV CODE 255: Performed by: RADIOLOGY

## 2020-01-01 PROCEDURE — 160009 HCHG ANES TIME/MIN: Performed by: INTERNAL MEDICINE

## 2020-01-01 PROCEDURE — 85025 COMPLETE CBC W/AUTO DIFF WBC: CPT

## 2020-01-01 PROCEDURE — 160002 HCHG RECOVERY MINUTES (STAT)

## 2020-01-01 PROCEDURE — 88173 CYTOPATH EVAL FNA REPORT: CPT

## 2020-01-01 RX ORDER — HYDRALAZINE HYDROCHLORIDE 20 MG/ML
5 INJECTION INTRAMUSCULAR; INTRAVENOUS
Status: DISCONTINUED | OUTPATIENT
Start: 2020-01-01 | End: 2020-01-01 | Stop reason: HOSPADM

## 2020-01-01 RX ORDER — OXYCODONE HCL 5 MG/5 ML
10 SOLUTION, ORAL ORAL
Status: DISCONTINUED | OUTPATIENT
Start: 2020-01-01 | End: 2020-01-01 | Stop reason: HOSPADM

## 2020-01-01 RX ORDER — ACETAMINOPHEN 500 MG
500-1000 TABLET ORAL EVERY 6 HOURS PRN
COMMUNITY

## 2020-01-01 RX ORDER — SODIUM CHLORIDE 9 MG/ML
INJECTION, SOLUTION INTRAVENOUS CONTINUOUS
Status: CANCELLED | OUTPATIENT
Start: 2020-01-01

## 2020-01-01 RX ORDER — SODIUM CHLORIDE 9 MG/ML
500 INJECTION, SOLUTION INTRAVENOUS
Status: ACTIVE | OUTPATIENT
Start: 2020-01-01 | End: 2020-01-01

## 2020-01-01 RX ORDER — ONDANSETRON 2 MG/ML
4 INJECTION INTRAMUSCULAR; INTRAVENOUS EVERY 8 HOURS PRN
Status: CANCELLED | OUTPATIENT
Start: 2020-01-01 | End: 2020-01-01

## 2020-01-01 RX ORDER — VERAPAMIL HYDROCHLORIDE 2.5 MG/ML
INJECTION, SOLUTION INTRAVENOUS
Status: COMPLETED
Start: 2020-01-01 | End: 2020-01-01

## 2020-01-01 RX ORDER — ALUMINUM ZIRCONIUM TRICHLOROHYDREX GLY 0.19 G/G
20 STICK TOPICAL DAILY
COMMUNITY

## 2020-01-01 RX ORDER — ONDANSETRON 2 MG/ML
4 INJECTION INTRAMUSCULAR; INTRAVENOUS PRN
Status: ACTIVE | OUTPATIENT
Start: 2020-01-01 | End: 2020-01-01

## 2020-01-01 RX ORDER — SUCCINYLCHOLINE/SOD CL,ISO/PF 200MG/10ML
SYRINGE (ML) INTRAVENOUS PRN
Status: DISCONTINUED | OUTPATIENT
Start: 2020-01-01 | End: 2020-01-01 | Stop reason: SURG

## 2020-01-01 RX ORDER — PHENYLEPHRINE HYDROCHLORIDE 10 MG/ML
INJECTION, SOLUTION INTRAMUSCULAR; INTRAVENOUS; SUBCUTANEOUS PRN
Status: DISCONTINUED | OUTPATIENT
Start: 2020-01-01 | End: 2020-01-01 | Stop reason: SURG

## 2020-01-01 RX ORDER — MEPERIDINE HYDROCHLORIDE 25 MG/ML
12.5 INJECTION INTRAMUSCULAR; INTRAVENOUS; SUBCUTANEOUS
Status: DISCONTINUED | OUTPATIENT
Start: 2020-01-01 | End: 2020-01-01 | Stop reason: HOSPADM

## 2020-01-01 RX ORDER — LIDOCAINE HYDROCHLORIDE 20 MG/ML
INJECTION, SOLUTION EPIDURAL; INFILTRATION; INTRACAUDAL; PERINEURAL PRN
Status: DISCONTINUED | OUTPATIENT
Start: 2020-01-01 | End: 2020-01-01 | Stop reason: SURG

## 2020-01-01 RX ORDER — OXYCODONE HYDROCHLORIDE 10 MG/1
10 TABLET ORAL
Status: CANCELLED | OUTPATIENT
Start: 2020-01-01 | End: 2020-01-01

## 2020-01-01 RX ORDER — SODIUM CHLORIDE, SODIUM LACTATE, POTASSIUM CHLORIDE, CALCIUM CHLORIDE 600; 310; 30; 20 MG/100ML; MG/100ML; MG/100ML; MG/100ML
INJECTION, SOLUTION INTRAVENOUS CONTINUOUS
Status: DISCONTINUED | OUTPATIENT
Start: 2020-01-01 | End: 2020-01-01 | Stop reason: HOSPADM

## 2020-01-01 RX ORDER — OCTREOTIDE ACETATE 100 UG/ML
300 INJECTION, SOLUTION INTRAVENOUS; SUBCUTANEOUS ONCE
Status: CANCELLED | OUTPATIENT
Start: 2020-01-01 | End: 2020-01-01

## 2020-01-01 RX ORDER — MORPHINE SULFATE 4 MG/ML
4 INJECTION, SOLUTION INTRAMUSCULAR; INTRAVENOUS
Status: DISCONTINUED | OUTPATIENT
Start: 2020-01-01 | End: 2020-01-01 | Stop reason: HOSPADM

## 2020-01-01 RX ORDER — HYDROMORPHONE HYDROCHLORIDE 1 MG/ML
0.1 INJECTION, SOLUTION INTRAMUSCULAR; INTRAVENOUS; SUBCUTANEOUS
Status: DISCONTINUED | OUTPATIENT
Start: 2020-01-01 | End: 2020-01-01 | Stop reason: HOSPADM

## 2020-01-01 RX ORDER — OXYCODONE HYDROCHLORIDE 5 MG/1
5 TABLET ORAL
Status: CANCELLED | OUTPATIENT
Start: 2020-01-01 | End: 2020-01-01

## 2020-01-01 RX ORDER — MORPHINE SULFATE 4 MG/ML
4 INJECTION, SOLUTION INTRAMUSCULAR; INTRAVENOUS
Status: CANCELLED | OUTPATIENT
Start: 2020-01-01 | End: 2020-01-01

## 2020-01-01 RX ORDER — MIDAZOLAM HYDROCHLORIDE 1 MG/ML
INJECTION INTRAMUSCULAR; INTRAVENOUS
Status: COMPLETED
Start: 2020-01-01 | End: 2020-01-01

## 2020-01-01 RX ORDER — LIDOCAINE AND PRILOCAINE 25; 25 MG/G; MG/G
1 CREAM TOPICAL PRN
Status: CANCELLED | OUTPATIENT
Start: 2020-01-01

## 2020-01-01 RX ORDER — SODIUM CHLORIDE 9 MG/ML
INJECTION, SOLUTION INTRAVENOUS CONTINUOUS
Status: DISCONTINUED | OUTPATIENT
Start: 2020-01-01 | End: 2020-01-01 | Stop reason: HOSPADM

## 2020-01-01 RX ORDER — HYDROMORPHONE HYDROCHLORIDE 1 MG/ML
0.4 INJECTION, SOLUTION INTRAMUSCULAR; INTRAVENOUS; SUBCUTANEOUS
Status: DISCONTINUED | OUTPATIENT
Start: 2020-01-01 | End: 2020-01-01 | Stop reason: HOSPADM

## 2020-01-01 RX ORDER — BUPIVACAINE HYDROCHLORIDE 2.5 MG/ML
INJECTION, SOLUTION EPIDURAL; INFILTRATION; INTRACAUDAL
Status: DISCONTINUED | OUTPATIENT
Start: 2020-01-01 | End: 2020-01-01 | Stop reason: HOSPADM

## 2020-01-01 RX ORDER — HYDROMORPHONE HYDROCHLORIDE 1 MG/ML
0.2 INJECTION, SOLUTION INTRAMUSCULAR; INTRAVENOUS; SUBCUTANEOUS
Status: DISCONTINUED | OUTPATIENT
Start: 2020-01-01 | End: 2020-01-01 | Stop reason: HOSPADM

## 2020-01-01 RX ORDER — HEPARIN SODIUM,PORCINE 1000/ML
VIAL (ML) INJECTION
Status: COMPLETED
Start: 2020-01-01 | End: 2020-01-01

## 2020-01-01 RX ORDER — MIDAZOLAM HYDROCHLORIDE 1 MG/ML
.5-2 INJECTION INTRAMUSCULAR; INTRAVENOUS PRN
Status: ACTIVE | OUTPATIENT
Start: 2020-01-01 | End: 2020-01-01

## 2020-01-01 RX ORDER — NALOXONE HYDROCHLORIDE 4 MG/.1ML
SPRAY NASAL
COMMUNITY
Start: 2020-01-01

## 2020-01-01 RX ORDER — OXYCODONE HYDROCHLORIDE 10 MG/1
10 TABLET ORAL
Status: DISCONTINUED | OUTPATIENT
Start: 2020-01-01 | End: 2020-01-01 | Stop reason: HOSPADM

## 2020-01-01 RX ORDER — ONDANSETRON 2 MG/ML
4 INJECTION INTRAMUSCULAR; INTRAVENOUS EVERY 8 HOURS PRN
Status: DISCONTINUED | OUTPATIENT
Start: 2020-01-01 | End: 2020-01-01 | Stop reason: HOSPADM

## 2020-01-01 RX ORDER — OMEPRAZOLE 40 MG/1
40 CAPSULE, DELAYED RELEASE ORAL DAILY
Qty: 21 CAP | Refills: 1 | Status: SHIPPED | OUTPATIENT
Start: 2020-01-01 | End: 2020-01-01

## 2020-01-01 RX ORDER — ONDANSETRON 2 MG/ML
4 INJECTION INTRAMUSCULAR; INTRAVENOUS
Status: DISCONTINUED | OUTPATIENT
Start: 2020-01-01 | End: 2020-01-01 | Stop reason: HOSPADM

## 2020-01-01 RX ORDER — ROCURONIUM BROMIDE 10 MG/ML
INJECTION, SOLUTION INTRAVENOUS PRN
Status: DISCONTINUED | OUTPATIENT
Start: 2020-01-01 | End: 2020-01-01 | Stop reason: SURG

## 2020-01-01 RX ORDER — IPRATROPIUM BROMIDE AND ALBUTEROL SULFATE 2.5; .5 MG/3ML; MG/3ML
3 SOLUTION RESPIRATORY (INHALATION)
Status: DISCONTINUED | OUTPATIENT
Start: 2020-01-01 | End: 2020-01-01 | Stop reason: HOSPADM

## 2020-01-01 RX ORDER — OXYCODONE HCL 5 MG/5 ML
5 SOLUTION, ORAL ORAL
Status: DISCONTINUED | OUTPATIENT
Start: 2020-01-01 | End: 2020-01-01 | Stop reason: HOSPADM

## 2020-01-01 RX ORDER — MIDAZOLAM HYDROCHLORIDE 1 MG/ML
1 INJECTION INTRAMUSCULAR; INTRAVENOUS
Status: DISCONTINUED | OUTPATIENT
Start: 2020-01-01 | End: 2020-01-01 | Stop reason: HOSPADM

## 2020-01-01 RX ORDER — SODIUM CHLORIDE, SODIUM LACTATE, POTASSIUM CHLORIDE, CALCIUM CHLORIDE 600; 310; 30; 20 MG/100ML; MG/100ML; MG/100ML; MG/100ML
INJECTION, SOLUTION INTRAVENOUS
Status: DISCONTINUED | OUTPATIENT
Start: 2020-01-01 | End: 2020-01-01 | Stop reason: SURG

## 2020-01-01 RX ORDER — MORPHINE SULFATE 15 MG/1
30 TABLET, FILM COATED, EXTENDED RELEASE ORAL
COMMUNITY
Start: 2020-01-01 | End: 2020-01-01 | Stop reason: SDUPTHER

## 2020-01-01 RX ORDER — DIPHENHYDRAMINE HYDROCHLORIDE 50 MG/ML
12.5 INJECTION INTRAMUSCULAR; INTRAVENOUS
Status: DISCONTINUED | OUTPATIENT
Start: 2020-01-01 | End: 2020-01-01 | Stop reason: HOSPADM

## 2020-01-01 RX ORDER — OXYCODONE HYDROCHLORIDE 5 MG/1
5 TABLET ORAL
Status: DISCONTINUED | OUTPATIENT
Start: 2020-01-01 | End: 2020-01-01 | Stop reason: HOSPADM

## 2020-01-01 RX ORDER — LABETALOL HYDROCHLORIDE 5 MG/ML
5 INJECTION, SOLUTION INTRAVENOUS
Status: DISCONTINUED | OUTPATIENT
Start: 2020-01-01 | End: 2020-01-01 | Stop reason: HOSPADM

## 2020-01-01 RX ORDER — HALOPERIDOL 5 MG/ML
1 INJECTION INTRAMUSCULAR
Status: DISCONTINUED | OUTPATIENT
Start: 2020-01-01 | End: 2020-01-01 | Stop reason: HOSPADM

## 2020-01-01 RX ADMIN — FENTANYL CITRATE 25 MCG: 50 INJECTION INTRAMUSCULAR; INTRAVENOUS at 08:54

## 2020-01-01 RX ADMIN — MIDAZOLAM HYDROCHLORIDE 1 MG: 1 INJECTION, SOLUTION INTRAMUSCULAR; INTRAVENOUS at 08:54

## 2020-01-01 RX ADMIN — ROCURONIUM BROMIDE 10 MG: 10 INJECTION, SOLUTION INTRAVENOUS at 09:11

## 2020-01-01 RX ADMIN — MIDAZOLAM HYDROCHLORIDE 1 MG: 1 INJECTION, SOLUTION INTRAMUSCULAR; INTRAVENOUS at 09:06

## 2020-01-01 RX ADMIN — FENTANYL CITRATE 25 MCG: 50 INJECTION INTRAMUSCULAR; INTRAVENOUS at 09:57

## 2020-01-01 RX ADMIN — MIDAZOLAM HYDROCHLORIDE 1 MG: 1 INJECTION, SOLUTION INTRAMUSCULAR; INTRAVENOUS at 09:10

## 2020-01-01 RX ADMIN — LIDOCAINE HYDROCHLORIDE 70 MG: 20 INJECTION, SOLUTION EPIDURAL; INFILTRATION; INTRACAUDAL; PERINEURAL at 09:11

## 2020-01-01 RX ADMIN — FENTANYL CITRATE 25 MCG: 50 INJECTION INTRAMUSCULAR; INTRAVENOUS at 09:26

## 2020-01-01 RX ADMIN — MIDAZOLAM HYDROCHLORIDE 1 MG: 1 INJECTION, SOLUTION INTRAMUSCULAR; INTRAVENOUS at 09:26

## 2020-01-01 RX ADMIN — IOHEXOL 100 ML: 350 INJECTION, SOLUTION INTRAVENOUS at 14:34

## 2020-01-01 RX ADMIN — IOHEXOL 25 ML: 240 INJECTION, SOLUTION INTRATHECAL; INTRAVASCULAR; INTRAVENOUS; ORAL at 14:35

## 2020-01-01 RX ADMIN — PROPOFOL 200 MCG/KG/MIN: 10 INJECTION, EMULSION INTRAVENOUS at 09:08

## 2020-01-01 RX ADMIN — FENTANYL CITRATE 100 MCG: 50 INJECTION, SOLUTION INTRAMUSCULAR; INTRAVENOUS at 09:29

## 2020-01-01 RX ADMIN — Medication 120 MG: at 09:11

## 2020-01-01 RX ADMIN — IOHEXOL 75 ML: 300 INJECTION, SOLUTION INTRAVENOUS at 09:50

## 2020-01-01 RX ADMIN — PHENYLEPHRINE HYDROCHLORIDE 200 MCG: 10 INJECTION INTRAVENOUS at 09:50

## 2020-01-01 RX ADMIN — SODIUM CHLORIDE: 9 INJECTION, SOLUTION INTRAVENOUS at 07:02

## 2020-01-01 RX ADMIN — PROPOFOL 140 MG: 10 INJECTION, EMULSION INTRAVENOUS at 09:11

## 2020-01-01 RX ADMIN — FENTANYL CITRATE 25 MCG: 50 INJECTION INTRAMUSCULAR; INTRAVENOUS at 09:06

## 2020-01-01 RX ADMIN — FENTANYL CITRATE 25 MCG: 50 INJECTION, SOLUTION INTRAMUSCULAR; INTRAVENOUS at 08:54

## 2020-01-01 RX ADMIN — IOHEXOL 100 ML: 350 INJECTION, SOLUTION INTRAVENOUS at 14:53

## 2020-01-01 RX ADMIN — IOHEXOL 100 ML: 350 INJECTION, SOLUTION INTRAVENOUS at 15:46

## 2020-01-01 RX ADMIN — IOHEXOL 90 ML: 350 INJECTION, SOLUTION INTRAVENOUS at 13:17

## 2020-01-01 RX ADMIN — SODIUM CHLORIDE, POTASSIUM CHLORIDE, SODIUM LACTATE AND CALCIUM CHLORIDE: 600; 310; 30; 20 INJECTION, SOLUTION INTRAVENOUS at 09:06

## 2020-01-01 ASSESSMENT — ENCOUNTER SYMPTOMS
WEIGHT LOSS: 1
FEVER: 0
ABDOMINAL PAIN: 1
BRUISES/BLEEDS EASILY: 0
RESPIRATORY NEGATIVE: 1
CARDIOVASCULAR NEGATIVE: 1
VOMITING: 0
NAUSEA: 1
DIAPHORESIS: 0
NERVOUS/ANXIOUS: 1
SORE THROAT: 0
CHILLS: 0

## 2020-01-01 ASSESSMENT — FIBROSIS 4 INDEX
FIB4 SCORE: 2.44
FIB4 SCORE: 3.4
FIB4 SCORE: 2.44

## 2020-01-01 ASSESSMENT — LIFESTYLE VARIABLES: SUBSTANCE_ABUSE: 0

## 2020-04-29 NOTE — OR NURSING
"Preadmit appointment: \" Preparing for your Procedure information\" sheet given to patient with verbal  instructions. Patient instructed to continue prescribed medications through the day before surgery, instructed to take the following medications the day of surgery per anesthesia protocol: Oxycodone if needed, Zofran if needed.  Pt denies issues with anesthesia.  Pt denies any respiratory symptoms or fever or exposure to anyone with positive COVID 19 advised pt if this changes to notify 's office prior to coming in for procedure  "

## 2020-05-01 PROBLEM — F11.20 NARCOTIC DEPENDENCE (HCC): Status: ACTIVE | Noted: 2020-01-01

## 2020-05-01 NOTE — ANESTHESIA POSTPROCEDURE EVALUATION
Patient: Vivian Bajwa    Procedure Summary     Date:  05/01/20 Room / Location:   ENDOSCOPIC ULTRASOUND ROOM / SURGERY [facility]    Anesthesia Start:  0906 Anesthesia Stop:  1003    Procedures:       GASTROSCOPY - WITH BIOSIES (Abdomen)      EGD, WITH ENDOSCOPIC US - UPPER CURVILINEAR      EGD, WITH ASPIRATION BIOPSY - OF PANCREATIC MASS      BLOCK, CELIAC PLEXUS Diagnosis:  (METASTATIC COLON CANCER AND PANCREATIC BODY MASS)    Surgeon:  Britton Gonzalez M.D. Responsible Provider:  Benjamin Slater M.D.    Anesthesia Type:  general ASA Status:  2          Final Anesthesia Type: general  Last vitals  BP   Blood Pressure: 133/65    Temp   36.7 °C (98.1 °F)    Pulse   Pulse: 70   Resp   18    SpO2   100 %      Anesthesia Post Evaluation    Patient location during evaluation: PACU  Patient participation: complete - patient participated  Level of consciousness: awake and alert    Airway patency: patent  Anesthetic complications: no  Cardiovascular status: hemodynamically stable  Respiratory status: acceptable  Hydration status: euvolemic    PONV: none           Nurse Pain Score: 0 (NPRS)

## 2020-05-01 NOTE — ANESTHESIA PROCEDURE NOTES
Airway  Date/Time: 5/1/2020 9:13 AM  Performed by: Benjamin Slater M.D.  Authorized by: Benjamin Slater M.D.     Location:  OR  Urgency:  Elective  Difficult Airway: No    Indications for Airway Management:  Anesthesia      Spontaneous Ventilation: absent    Sedation Level:  Deep  Preoxygenated: Yes    Patient Position:  Sniffing  Mask Difficulty Assessment:  1 - vent by mask  Final Airway Type:  Endotracheal airway  Final Endotracheal Airway:  ETT  Cuffed: Yes    Technique Used for Successful ETT Placement:  Direct laryngoscopy    Insertion Site:  Oral  Blade Type:  Bernstein  Laryngoscope Blade/Videolaryngoscope Blade Size:  2  ETT Size (mm):  7.0  Measured from:  Teeth  ETT to Teeth (cm):  21  Placement Verified by: auscultation and capnometry    Cormack-Lehane Classification:  Grade IIa - partial view of glottis  Number of Attempts at Approach:  1

## 2020-05-01 NOTE — PROGRESS NOTES
Patient had mild self-limited abdominal pain after ERCP, but now is pain-free.  Exam showed non-distended abdomen, and patient was in no distress, and without nausea or vomiting.  I personally spoke to patient after she was completely awake and she stated that she is feeling well and wishes to go home.  I instructed patient to call my office [phone] if she develops any problems or recurrence of symptoms, and to go to ER if needed.  She stated understanding and acceptance of this responsibility.

## 2020-05-01 NOTE — OR NURSING
1000: Arrived from OR via gurney. On 6L oxymask. Resting comfortable. Patient rouses. Denies pain or nausea at this time   1005: Patient transitioned to room air. Resting comfortably.   1015:  updated via telephone. Patient states some discomfort in abdomen but it is tolerable. Sips of water requested and tolerated.   1025: Patient appropriate for stage II.   1034: Patient leaving PACU. VSS.

## 2020-05-01 NOTE — DISCHARGE INSTRUCTIONS
ENDOSCOPY HOME CARE INSTRUCTIONS    GASTROSCOPY OR ERCP  1. Don't eat or drink anything for about an hour after the test. You can then resume your regular diet.  2. Don't drive or drink alcohol for 24 hours. The medication you received will make you too drowsy.  3. Don't take any coffee, tea, or aspirin products until after you see your doctor. These can harm the lining of your stomach.  4. If you begin to vomit bloody material, or develop black or bloody stools, call your doctor as soon as possible.  5. If you have any neck, chest, abdominal pain or temp of 100 degrees, call your doctor.  6. See your doctor call for follow up   7. Additional instructions:     No alcohol or sleeping pills today.   Patient not to drive, operate heavy machinery or make important decisions for 24 hours.   Call [facility] as needed.     Results to convey to patient: pancreas body mass biopsied confirming cancer, type pending additional staining, please see Dr. Tate oncologist for results.  Celiac plexus neurolysis performed to try to help with pain.      8. Prescriptions: Prilosec       Depression / Suicide Risk    As you are discharged from this [facility] facility, it is important to learn how to keep safe from harming yourself.    Recognize the warning signs:  · Abrupt changes in personality, positive or negative- including increase in energy   · Giving away possessions  · Change in eating patterns- significant weight changes-  positive or negative  · Change in sleeping patterns- unable to sleep or sleeping all the time   · Unwillingness or inability to communicate  · Depression  · Unusual sadness, discouragement and loneliness  · Talk of wanting to die  · Neglect of personal appearance   · Rebelliousness- reckless behavior  · Withdrawal from people/activities they love  · Confusion- inability to concentrate     If you or a loved one observes any of these behaviors or has concerns about self-harm, here's what you can  do:  · Talk about it- your feelings and reasons for harming yourself  · Remove any means that you might use to hurt yourself (examples: pills, rope, extension cords, firearm)  · Get professional help from the community (Mental Health, Substance Abuse, psychological counseling)  · Do not be alone:Call your Safe Contact- someone whom you trust who will be there for you.  · Call your local CRISIS HOTLINE 180-3219 or 676-732-5216  · Call your local Children's Mobile Crisis Response Team [place] (530) 384-7257 or www.Pushkart  · Call the toll free National Suicide Prevention Hotlines   · National Suicide Prevention Lifeline 326-084-MVYI (6521)  · National Hope Line Network 800-SUICIDE (719-0011)    I acknowledge receipt and understanding of these Home Care Instructions.

## 2020-05-01 NOTE — ANESTHESIA PREPROCEDURE EVALUATION
Relevant Problems   NEURO   (+) Headache      GI   (+) GERD (gastroesophageal reflux disease)         (+) Liver cancer (HCC)       Physical Exam    Airway   Mallampati: II  TM distance: >3 FB  Neck ROM: full       Cardiovascular - normal exam  Rhythm: regular  Rate: normal  (-) murmur     Dental - normal exam           Pulmonary - normal exam  Breath sounds clear to auscultation     Abdominal    Neurological - normal exam                 Anesthesia Plan    ASA 2       Plan - general       Airway plan will be ETT        Induction: intravenous    Postoperative Plan: Postoperative administration of opioids is intended.    Pertinent diagnostic labs and testing reviewed    Informed Consent:    Anesthetic plan and risks discussed with patient.    Use of blood products discussed with: patient whom consented to blood products.

## 2020-05-01 NOTE — PROGRESS NOTES
Patient seen and examined before proceeding with EGD, EUS-FNA celiac neurolysis for epigastric pain and pancreas mass.  Risks, benefits, and alternatives of aforementioned procedures were discussed with patient in detail.  Consenting person was given opportunities to ask questions and discuss other options.  Risks including but not limited to pancreatitis, diarrhea possibly refractory, perforation, infection, bleeding, missed lesion(s), possible need for surgery(ies) and/or interventional radiology, possible need for repeat procedure(s) and/or additional testing, hospitalization possibly prolonged, cardiac and/or pulmonary event, aspiration, hypoxia, stroke, medication and/or anesthesia reaction, indefinite diagnosis, discomfort, unsuccessful and/or incomplete procedure, ineffective therapy and/or persistent symptoms, damage to adjacent organs and/or vascular structures, and other adverse events possibly life-threatening.  Interactive discussion was undertaken with Layman's terms. I answered questions in full and to satisfaction.  Consenting person stated understanding and acceptance of these risks, and wished to proceed.  Informed consent was given in clear state of mind.

## 2020-05-01 NOTE — ANESTHESIA TIME REPORT
Anesthesia Start and Stop Event Times     Date Time Event    5/1/2020 0903 Ready for Procedure     0906 Anesthesia Start     1003 Anesthesia Stop        Responsible Staff  05/01/20    Name Role Begin End    Benjamin Slater M.D. Anesth 0906 1003        Preop Diagnosis (Free Text):  Pre-op Diagnosis     METASTATIC COLON CANCER AND PANCREATIC BODY MASS        Preop Diagnosis (Codes):    Post op Diagnosis  Metastatic colon cancer in female (HCC)  METASTATIC COLON CANCER AND PANCREATIC BODY MASS    Premium Reason  Non-Premium    Comments:

## 2020-05-01 NOTE — PROCEDURES
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 Benjamin Slater    Consent: Risks, benefits, and alternatives of aforementioned procedures were discussed with patient in detail before proceeding.  Consenting person was given opportunities to ask questions and discuss other options.  Risks including but not limited to pancreatitis, malignant seeding, diarrhea possible refractory, perforation, infection, bleeding, missed lesion(s), possible need for surgery(ies) and/or interventional radiology, possible need for repeat procedure(s) and/or additional testing, hospitalization possibly prolonged, cardiac and/or pulmonary event, aspiration, hypoxia, stroke, medication and/or anesthesia reaction, indefinite diagnosis, discomfort, unsuccessful and/or incomplete procedure, ineffective therapy and/or persistent symptoms, damage to adjacent organs and/or vascular structures, and other adverse events possibly life-threatening.  Interactive discussion was undertaken with Layman's terms.  I answered questions in full and to satisfaction.  Consenting person stated understanding and acceptance of these risks, and wished to proceed.  Informed consent was given in clear state of mind.    Endoscopic procedures in detail: Diagnostic endoscope was inserted from mouth into second portion of the duodenum, retroflexion was performed in the stomach.  Gastric and duodenal  biopsies were obtained.  I suctioned insufflated air and stomach fluid contents upon removal.      Curvilinear echoendoscope was inserted from mouth to second portion of the duodenum.  Pancreas was examined with identification of normal vascular landmarks including superior mesenteric artery, superior mesenteric vein, portal vein, celiac artery, portal vein confluence, and splenorenal junction.  Biliary duct was imaged and traced from intrapancreatic portion to hepatic hilum.  Celiac axis was imaged to look for echogenic lymph nodes.  Fine-needle aspiration biopsies were obtained with a 19-gauge SharkCore via a trans-gastric approach from pancreas body mass after Doppler studies had identified a vessel-free path for all needle biopsies.  A total of three (3) passes were obtained, multiple cytology slides were created and core or sampling remnants were sent to pathology.     Celiac axis was imaged to identify nerve plexus.  Fine-needle aspiration was obtained with a 19-gauge blunt tip needle with cutting stylet via a trans-gastric approach from celiac nerve plexus after Doppler studies had identified a vessel-free path, aspiration was performed to exclude vessel penetration.  Saline test dose was performed, then 20mL of 0.25% bupivacaine and 20mL of 98% dehydrated alcohol without preservatives. I suctioned insufflated air and stomach fluid contents upon removal.    Procedure times:  - In-room 09:06  - Start 09:18  - Completed 09:46  - Out of room per nursing records    Esophagogastroduodenoscopy Findings:  - Esophagus: endoscopically unremarkable.   - Stomach: endoscopically unremarkable, gastric biopsies obtained to evaluate H.pylori status.   - Duodenum: small 5x3mm clean-based bulbar posterior wall ulcer, biopsied    Endoscopic Ultrasound Findings:  - Pancreas: 2.6x2cm pancreas body solid mass without ductal dilation, fine-needle aspiration biopsies performed as per above.  - Bile duct: non-dilatated.  - Celiac  axis: neurolysis performed.    Impression:  1. Pancreas body solid mass, preliminary confirmed cancer but type is pending cores sampling staining.  2. EUS-guided celiac plexus neurolysis performed.    Recommendations:   1.  Routine post-endoscopy anesthesia recovery care.  Discharge patient when she is awake, alert and comfortable, when recovery criteria are met.  Aspiration and fall precautions x 24 hours.   2.  Avoid NSAIDs for 8 weeks.  3.  Prilosec 40mg PO QD x 21 days.  4.  Follow-up with oncologist Dr. Tate.  5.  I plan to send a pathology letter with final results and further recommendations.  6.  Follow-up as needed with me in clinic.   7.  I spoke to patient and recovery nurse about impression, diagnosis and recommendations.  I answered questions in full and to satisfaction

## 2020-05-01 NOTE — OR NURSING
1040- Patient dressed and resting in recliner chair. No pain or nausea reported. PO fluids provided.  1054- PIV removed. Discharge instructions completed at [facility]. Script for Prilosec provided. Discharged to care of family.

## 2020-05-15 NOTE — CONSULTS
Interventional Oncology Consultation      Re: Vivian Bajwa     MRN: [id number]   : 1965    Vivian Bajwa was referred by Colt Tate MD. She is a 54 y.o. 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 She is also under the care of Jorge Harrington MD.    This encounter was attempted via Zoom and My Chart, however due to technical difficulties (patient and provider both in virtual room but not able to share screen) this consultation was converted to telephone encounter. Verbal consent was obtained and identity was verified.     History of Present Illness:  Ms.Schlaffer Bajwa was last seen in our clinic 2017 after bilobar Y90 radioembolization performed by Yovanny Lamb MD. She demonstrated stable disease on her medical therapy until 2020 when a CT showed hepatic disease progression, and a follow up study in April showed significant progression in 4 weeks. Ms.Schlaffer Bajwa has been referred again to interventional radiology for evaluation and management with locoregional therapy. Prior interventions include:    · 2017 mapping angiogram (Zainab) [facility]  · 5/15/2017 LEFT Y90 SIRT 0.6 gb (16.2 mCi) (Zainab) [facility]  · 2017 RIGHT liver Y90 SIRT 1.1 gb (29.7 mCi) (Zainab) [facility]    Interval clinical history includes development abdominal pain and she also has a pancreatic mass. Biopsy on May 1 showed high grade, poorly differentiated neuroendocrine tumor. She is taking extended release morphine, which controls the pain, and had a celiac plexus block that helped with the left side abdominal pain but not the right. She is now on chemotherapy every two weeks with her next infusions planned 6/3- and -. She is being closely monitored for leukocytopenia with another lab draw in 2 days. Additional history includes Gilbert's disease. She is seen today for review of imaging studies  "and discussion of possible repeat intervention with Y90 radioembolization. Today, She reports the abdominal pain as above. She denies fatigue. The chemotherapy makes her nauseous and she has lost a little weight, currently 106 pounds. She is anxious about the diagnosis and is not taking her benzodiazapine medications due to the concurrent morphine prescription. She denies jaundice. She thinks she had some swelling in her right abdomen but it is less than before. She continues to work full time.     Past Medical History:   Diagnosis Date   • Anemia     history of   • Bowel habit changes     constipation- resolved   • Cancer (HCC) 2016    colon/liver (chemo/surgery)   • Closed head injury 2011   • Dental disorder     Permanent retainers upper and lower   • Dog bite 4/15/17    left knee   • Femur fracture (HCC)    • GERD (gastroesophageal reflux disease)    • Gilbert's disease    • Metastatic colon cancer to liver (HCC)    • Pain 03/2020    \"all over stomach\" lower back, shoulders   • Palpitations    • Panic disorder     takes Xanax as needed   • PMDD (premenstrual dysphoric disorder)    • Skull fracture (HCC) 2011   • Vocal cord nodules      Past Surgical History:   Procedure Laterality Date   • PB ENDOSCOPIC US EXAM, ESOPH  5/1/2020    Procedure: EGD, WITH ENDOSCOPIC US - UPPER CURVILINEAR;  Surgeon: Britton Gonzalez M.D.;  Location: [facility];  Service: Gastroenterology   • PB INJECT NERV BLCK,CELIAC PLEXUS  5/1/2020    Procedure: BLOCK, CELIAC PLEXUS;  Surgeon: Britton Gonzalez M.D.;  Location: [facility];  Service: Gastroenterology   • GASTROSCOPY-ENDO  5/1/2020    Procedure: GASTROSCOPY - WITH BIOSIES;  Surgeon: Britton Gonzalez M.D.;  Location: [facility];  Service: Gastroenterology   • EGD WITH ASP/BX  5/1/2020    Procedure: EGD, WITH ASPIRATION BIOPSY - OF PANCREATIC MASS;  Surgeon: Britton Gonzalez M.D.;  Location: [facility];  Service: Gastroenterology   • " OTHER  2017    Y90 5/15/17, 6/26/17= radiation embolization   • CATH PLACEMENT Right 11/1/2016    Procedure: CATH PLACEMENT - PORT ;  Surgeon: Jose Borrego M.D.;  Location: SURGERY SAME DAY [facility];  Service:    • CATH PLACEMENT  11/2016   • EXPLORATORY LAPAROTOMY  10/27/2016    Procedure: EXPLORATORY LAPAROTOMY;  Surgeon: Jose Borrego M.D.;  Location: SURGERY [facility];  Service:    • COLON RESECTION  10/27/2016    Procedure: RIGHT COLON RESECTION;  Surgeon: Jose Borrego M.D.;  Location: SURGERY [facility];  Service:    • LIVER BIOPSY  10/27/2016    Procedure: LIVER BIOPSY;  Surgeon: Jose Borrego M.D.;  Location: SURGERY [facility];  Service:    • ORIF, FEMUR Left 1980     Social History     Socioeconomic History   • Marital status:      Spouse name: Not on file   • Number of children: Not on file   • Years of education: Not on file   • Highest education level: Not on file   Occupational History   • Not on file   Social Needs   • Financial resource strain: Not on file   • Food insecurity     Worry: Not on file     Inability: Not on file   • Transportation needs     Medical: Not on file     Non-medical: Not on file   Tobacco Use   • Smoking status: Former Smoker     Packs/day: 0.25     Years: 20.00     Pack years: 5.00     Types: Cigarettes     Last attempt to quit: 10/26/2016     Years since quitting: 3.5   • Smokeless tobacco: Never Used   • Tobacco comment: 1 pack qweek   Substance and Sexual Activity   • Alcohol use: Yes     Comment: none since 3/2020; was glass wine 5 per week   • Drug use: No   • Sexual activity: Not on file   Lifestyle   • Physical activity     Days per week: Not on file     Minutes per session: Not on file   • Stress: Not on file   Relationships   • Social connections     Talks on phone: Not on file     Gets together: Not on file     Attends Gnosticist service: Not on file     Active member of club or organization: Not on file      Attends meetings of clubs or organizations: Not on file     Relationship status: Not on file   • Intimate partner violence     Fear of current or ex partner: Not on file     Emotionally abused: Not on file     Physically abused: Not on file     Forced sexual activity: Not on file   Other Topics Concern   • Not on file   Social History Narrative   • Not on file     Family History   Problem Relation Age of Onset   • Heart Disease Mother    • Cancer Father         Lung cancer   • Cancer Paternal Grandmother         breast cancer       Review of Systems   Constitutional: Positive for weight loss. Negative for chills, diaphoresis, fever and malaise/fatigue.   HENT: Negative for sore throat.    Respiratory: Negative.    Cardiovascular: Negative.    Gastrointestinal: Positive for abdominal pain and nausea. Negative for vomiting.        Positive for right lower quadrant swelling   Skin: Negative.    Endo/Heme/Allergies: Does not bruise/bleed easily.   Psychiatric/Behavioral: Negative for substance abuse (negative for tobacco and alcohol). The patient is nervous/anxious.        A comprehensive 14-point review of systems was negative except as described above.     Labs:      Ref. Range 5/1/2020 07:25 5/20/2020 07:57   WBC Latest Ref Range: 4.8 - 10.8 K/uL 4.4 (L) 2.5 (L)   RBC Latest Ref Range: 4.20 - 5.40 M/uL 4.09 (L) 3.74 (L)   Hemoglobin Latest Ref Range: 13.0 - 17.0 g/dL 13.2 12.2 (L)   Hematocrit Latest Ref Range: 39.0 - 50.0 % 40.1 36.5 (L)   MCV Latest Ref Range: 81.0 - 99.0 fL 98.0 (H) 97.6   MCH Latest Ref Range: 28.4 - 32.7 pg 32.3 32.6   MCHC Latest Ref Range: 33.0 - 37.0 g/dL 32.9 (L) 33.4   RDW Latest Ref Range: 11.5 - 14.5 % 41.6 11.3 (L)   Platelet Count Latest Ref Range: 130 - 400 K/uL 163 (L) 134   MPV Latest Ref Range: 7.4 - 10.4 fL 9.7 11.2 (H)   Neutrophils-Polys Latest Ref Range: 44.00 - 72.00 % 70.20    Neutrophils Automated Latest Ref Range: 39.0 - 70.0 %  70.1 (H)   Abs Neutrophils Automated Latest  Ref Range: 1.8 - 7.7 K/uL  1.8   Neutrophils (Absolute) Latest Ref Range: 2.00 - 7.15 K/uL 3.11    Lymphocytes Latest Ref Range: 22.00 - 41.00 % 16.90 (L)    Lymphocytes Automated Latest Ref Range: 21.0 - 50.0 %  25.5   Abs Lymph Automated Latest Ref Range: 1.2 - 4.8 K/uL  0.6 (L)   Lymphs (Absolute) Latest Ref Range: 1.00 - 4.80 K/uL 0.75 (L)    Monocytes Latest Ref Range: 0.00 - 13.40 % 9.30    Monos (Absolute) Latest Ref Range: 0.2 - 0.9 K/uL 0.41 0.1 (L)   Eosinophils Latest Ref Range: 0.00 - 6.90 % 2.90    Eosinophils Automated Latest Ref Range: 0.0 - 5.0 %  0.8   Eos (Absolute) Latest Ref Range: 0.00 - 0.51 K/uL 0.13    Basophils Latest Ref Range: 0.00 - 1.80 % 0.50    Basophils Automated Latest Ref Range: 0.0 - 3.0 %  1.2   Baso (Absolute) Latest Ref Range: 0.00 - 0.12 K/uL 0.02    Immature Granulocytes Latest Ref Range: 0.00 - 0.90 % 0.20    Immature Granulocytes (abs) Latest Ref Range: 0.00 - 0.11 K/uL 0.01    Nucleated RBC Latest Units: /100 WBC 0.00    NRBC (Absolute) Latest Units: K/uL 0.00    Monocytes Automated Latest Ref Range: 1.7 - 10.0 %  2.4      Ref. Range 5/12/2020 12:20 5/20/2020 07:57   Sodium Latest Ref Range: 137 - 145 mmol/L 137 138   Potassium Latest Ref Range: 3.5 - 5.1 mmol/L 4.2 4.7   Chloride Latest Ref Range: 98 - 107 mmol/L 98 100   Co2 Latest Ref Range: 22 - 30 mmol/L 27 29   Anion Gap Latest Ref Range: 4 - 12 mmol/L 12.0 9   Glucose Latest Ref Range: 70 - 100 mg/dL 101 (H) 101 (H)   Bun Latest Ref Range: 7 - 17 mg/dL 9 11   Creatinine Latest Ref Range: 0.6 - 1.0 mg/dL 0.46 (L) 0.6   GFR If  Latest Ref Range: >60 mL/min/1.73 m 2 >60 >60   GFR If Non  Latest Ref Range: >60 mL/min/1.73 m 2 >60 >60   Calcium Latest Ref Range: 8.7 - 10.5 mg/dL 9.9 10.2   AST(SGOT) Latest Ref Range: 15 - 46 U/L 93 (H) 150 (H)   ALT(SGPT) Latest Ref Range: 13 - 69 U/L 66 (H) 102 (H)   Alkaline Phosphatase Latest Ref Range: 38 - 126 U/L 398 (H) 454 (H)   Total Bilirubin  Latest Ref Range: 0.2 - 1.3 mg/dL 1.1 1.5 (H)   Albumin Latest Ref Range: 3.5 - 5.0 g/dL 4.5 4.8   Total Protein Latest Ref Range: 6.3 - 8.2 g/dL 6.8 7.6   Globulin Latest Ref Range: 1.9 - 3.5 g/dL 2.3    A-G Ratio Unknown 2.0 1.7      Ref. Range 5/1/2020 07:25   PT Latest Ref Range: 12.0 - 14.6 sec 13.0   INR Latest Ref Range: 0.87 - 1.13  0.97      Ref. Range 12/15/2017 17:10 1/9/2018 07:20 1/8/2019 10:40 4/30/2019 19:31 8/6/2019 09:07   Carcinoembryonic Antigen Latest Units: ng/mL 0.6 1.1 1.3 1.4 14.7       NLR 2.69    Pathology:  [facility], October 27, 2016:  FINAL DIAGNOSIS:    A. Right colon resection:         Right colon demonstrates an invasive moderate to poorly          differentiated adenocarcinoma, including focal mucinous          differentiation.         The invasive adenocarcinoma is positive for synaptophysin          predominantly within the poorly differentiated component and          focally within the surrounding glands, consistent with focal          neuroendocrine differentiation.         The invasive adenocarcinoma invades into the subserosal adipose          tissue.         The proximal, distal and mesenteric resection margins are          negative for malignancy.         There is angiolymphatic space invasion identified.         Eight of twenty-nine lymph nodes (8/29) positive for metastatic          adenocarcinoma.         There are approximately eight soft tissue tumor deposits.          Several of the tumor deposits are suggestive for former lymph          nodes completely replaced by metastatic adenocarcinoma.         Benign appendix negative for acute appendicitis or malignancy.         Benign terminal ileum negative for malignancy.         See synoptic report and comment below.  B. Liver biopsy:         Liver demonstrates a metastatic poorly differentiated carcinoma,          morphologically consistent with metastatic poorly          differentiated colon adenocarcinoma to the liver.          The metastatic adenocarcinoma extends to the cauterized margin          of the biopsy piece.  ADDENDUM:    A request for NESTOR/ZAHIRA Panel (Extended KRAS) molecular analysis was sent  out on 01/19/2017 for patient Vivian Bajwa from Dr. Colt Tate.  The test was requested to be performed on tissue from case  [id number], (signed out on 10/31/2016).  The case report, slides, and  blocks for the cited accession were retrieved from archives.  Dr. Phan  reviewed the original pathology report, examined candidate H&E slides,  and selected the block A5 appropriate to the specifications of the  ordered molecular analysis.  The block was forwarded to [facility] where the subject molecular test was performed.  Below  lists the results of the molecular test[s] ordered.    Block A5 was sent to [facility] for NESTOR/ZAHIRA Panel; a  report was generated.  Result:  DETECTED ABNORMALITIES BRAF.  Please  see separate [facility] report for further details.  ADDENDUM:    This case is being addended to report the results of IHC studies for  mismatch repair proteins to evaluate for Galvin syndrome. All four  mismatch repair proteins are expressed in the tumor cells. These  findings make Galvin syndrome unlikely. These results are also reported  in the IHC section below.    Radiology:   CT abdomen w/ and w/o on April 21, 2020 at [facility]:  1.  2.9 x 2.4 x 2.6 cm low-attenuation mass in the body the pancreas corresponding with PET/CT findings could represent primary malignancy or metastatic disease.   2.  Hepatic metastases have increased in size though are lower in attenuation which could indicate central necrosis versus progression of disease.          PET CT April 10, 2020 at [facility]:   Multiple hypermetabolic hepatic lesions compatible with metastatic disease.     Increased focal uptake in the pancreatic body is again noted. This is increased in size and FDG avidity compared to prior. This may  represent metastatic disease or a pancreatic primary. Further evaluation with pancreatic protocol CT or MRI is   recommended.     Focus of mild uptake in the left lower neck without a definite corresponding lymph node identified. Attention to this area on follow-up imaging is recommended.     Previously noted left internal mammary uptake is significantly improved.     Status post right hemicolectomy.    CT CAP March 21, 2020 at [facility]:  1.  Apparent interval progression of hepatic metastatic disease.  2.  Postoperative change from partial colectomy.  3.  No evidence for metastatic disease in the chest.    ---Multiple interval scans with stable hepatic disease---    Nuclear medicine Bremsstrahlung study 6/26/17 at [facility]:  The prescribed dose was  1.1 gb ( 29.7 mCi). The drawn dose was 1.2 gb (32.7mCi). Delivered dose after residual was measured at 1.05 gb ( 28.15 mCi). This represents  95% of the prescribed dose and  87% of the drawn dose. Bremsstrahlung images obtained   after the Y-90 radioembolization, confirm proper delivery to the targeted region as well as some deposition in the left lobe, presumably related to reflux secondary to a short segment of common hepatic artery between the left hepatic and divisions of the   anterior and posterior right hepatic arteries which necessitated placement of the microcatheter just beyond the level of the left hepatic artery takeoff. There is no uptake outside the planned treatment area. NOTE: The patient will be followed by   interventional radiology and oncology.    Y90 SIRT right  Liver 6/26/17 at [facility]:  1.  Technically successful Y90 radioembolization of the right hepatic lobe. The patient will followup in approximately 10 days for laboratory and clinical evaluation and 4-6 weeks for imaging evaluation.  2.  Immediately following the procedure, the patient was transported to nuclear medicine for planar imaging which demonstrates Bremsstrahlung emission from the right  of the liver. There is no definite evidence of extrahepatic deposition of radioembolic material.    Nuclear medicine Bremsstrahlung study 5/15/17 at [facility]:  The prescribed dose was  0.6 gb ( 16.2 mCi). The drawn dose was .63 gb (17.1mCi). Delivered dose after residual was measured at .6267 gb ( 16.92 mCi). This represents  104% of the prescribed dose and  99% of the drawn dose. Bremsstrahlung images obtained   after the Y-90 radioembolization, confirm proper delivery to the targeted region. There is no uptake outside the planned treatment area. NOTE: The patient will be followed by interventional radiology and oncology.    Y90 SIRT left liver 5/15/17 at [facility]:  1.  Technically successful Y90 radioembolization of the left hepatic lobe. The patient will followup in approximately 10 days for laboratory and clinical evaluation and 4-6 weeks for imaging evaluation.  2.  Immediately following the procedure, the patient was transported to nuclear medicine for SPECT imaging which demonstrates Bremsstrahlung emission from the left lobe of the liver. There is no definite evidence of extrahepatic deposition of   radioembolic material.    Nuclear medicine lung shunt study 5/9/17 at [facility]:  Quantitative lung scan as described.    Mapping angiogram 5/9/17 at [facility]:  1.  Celiac arteriogram demonstrating no evidence of variant hepatic arterial anatomy.  2.  Common hepatic arteriogram demonstrating no evidence of variant hepatic arterial anatomy.  3.  Selective supraduodenal arteriogram.  4.  Selective supraduodenal artery embolization.  5.  Selective gastroduodenal arteriogram demonstrating retrograde flow.  6.  Proper hepatic arteriogram demonstrating no evidence of variant hepatic arterial anatomy.  7.  Selective right hepatic arteriogram demonstrating no evidence of variant hepatic arterial anatomy.  8.  Selective left hepatic arteriogram demonstrating no evidence of variant hepatic arterial anatomy.  9.  Intra-arterial  administration of 4.4 mCi technetium 99m labeled MAA into the hepatic artery demonstrated a hepatic pulmonary shunt fraction of 2.6%.       CT chest abdomen pelvis February 21, 2017 at [facility]:  1.  Decrease in size of multiple metastatic lesions within liver consistent with treatment response.  2.  Heterogeneous low-attenuation left lobe of the liver decreased consistent with treatment response.  3.  Thrombosed portal veins within the left lobe liver are partially recanalized.  4.  Proximal colectomy and ileocolic anastomosis. No evidence of bowel obstruction. No free fluid.  5.  Mild bilateral pelvocaliectasis similar to previous findings. Ureters are not well delineated.  6.  No pulmonary consolidation, mass, or pleural effusion.     CT abdomen pelvis December 28, 2016 at [facility]:  1.  Hepatomegaly with numerous hepatic metastases, as seen previously.  2.  RIGHT mid abdominal bowel anastomosis.  3.  No bowel obstruction or evidence for perforation.  4.  Increased colonic stool.  5.  Fibroid uterus.  Current Outpatient Medications   Medication Sig Dispense Refill   • omeprazole (PRILOSEC) 40 MG delayed-release capsule Take 1 Cap by mouth every day for 21 days. 21 Cap 1   • acetaminophen (TYLENOL) 500 MG Tab Take 500-1,000 mg by mouth every 6 hours as needed.     • oxyCODONE immediate-release (ROXICODONE) 5 MG Tab as needed.  0   • ibuprofen (MOTRIN) 200 MG Tab Take 200 mg by mouth every 6 hours as needed.     • ondansetron (ZOFRAN) 8 MG Tab as needed.     • docusate sodium (COLACE) 100 MG Cap Take 100 mg by mouth as needed for Constipation.       No current facility-administered medications for this encounter.        Allergies   Allergen Reactions   • Pcn [Penicillins]      Unknown allergy,rxn = as a kid  Tolerates cephalosporins     • Lexapro Unspecified     Heart raced   • Paxil [Paroxetine] Unspecified     Heart raced   • Zoloft Unspecified     Heart raced         Physical Exam   Constitutional: She is oriented  to person, place, and time.   Neurological: She is alert and oriented to person, place, and time.   Psychiatric: Mood, memory and judgment normal.     ECOG Performance Status 0    Impression:   1. Unresectable metastatic colon cancer to both hepatic lobes, amenable to repeat Y90 SIRT.  2. Gilbert's disease.  3. Gastroesophageal reflux disease.  4. Anxiety.  5. Panic disorder.    Plan:   Yovanny Lamb MD has reviewed Ms.Schlaffer Bajwa's history and imaging studies, examined the patient, and discussed treatment options. Ms.Schlaffer Bajwa is a candidate for palliative transarterial radioembolization of unresectable metastatic neuroendocrine cancer to both hepatic lobes. We will obtain an updated CT scan before proceeding due to the significant changes noted between March and April, and we explained that as long as the tumor burden was less than 70% we would be able to proceed. We discussed the method of the procedure at length including angiography and embolization as well as the expected clinical course with the possibility of post-embolization syndrome nausea and pain and hospitalization. We explained that this procedure is palliative and not curative. We discussed the possibility of tumor recurrence and development of future tumors.  We additionally discussed the risks, including bleeding and infection, damage to the arteries or adjacent tissue, reaction to any medications given during the procedure, potential side effects of contrast administration including renal damage, non-target embolization, radiation-induced ulcers or liver disease in the setting of radioembolization, liver failure, and death. There is a risk the procedure will not be effective. We discussed alternatives to the procedure including surveillance with no intervention and continuing chemotherapy. The patient verbalizes understanding of risks, benefits, and alternatives to IR intervention and elects to proceed. All questions were answered. She  may be contacted by a radiation oncologist for dose planning. Written pre- and post- procedure care instructions were provided. We explained that the patient will need to have ongoing surveillance after the procedure, which will be managed by the treating team, and that future treatment depends on multiple factors including lab studies, imaging, and performance status. We will email her confirmation paperwork as well as a recurring lab slip for draws at [facility]. She asked about holding her chemo schedule during IR treatment and we will coordinate this with her medical oncologist and explained she may need to keep her schedule to control the extrahepatic disease.    The plan is as follows, pending insurance authorization:  · Resume omeprazole.  · Mapping 6/9.  · Y90 SIRT right liver 6/19.   · Follow up with labs after 2 weeks.   · Plan Y90 SIRT left liver end of July.    FUNMILAYO Choi with Yovanny Lamb MD  Interventional Radiology   [place] [id number] [facility] ([id number])  [place] [id number]  [phone]

## 2020-05-26 NOTE — PROGRESS NOTES
Updated imaging and labs reviewed with Dr. Lamb. Pt contacted regarding evidence of disease progression in the liver but no contraindication to proceeding with Y90 as discussed.     Pt states she spoke with Dr. Tate this am regarding the pancreatic findings and he wishes for her to undergo another round of chemo prior to Y90.     Will change mapping to 6/19 and Y90 to 6/25. Appts confirmed and scheduling notified.

## 2020-06-19 NOTE — PROGRESS NOTES
IR Nursing Note:    Procedure Confirmed with MD, patient and RN pre procedure. Consent obtained by MD with all questions answered, placed in Patient's chart. Patient assisted to the table in the supine position with all bony prominences padded and draped in sterile fashion.    Hepatic Angiogram with Possible Embolization by MD Lamb assisted by RT Rob, left groin (femoral) access site sealed with an Angio-Seal Vascular Closure Device and CDI with a gauze and tegaderm dressing.     TERUMO Angio-Seal Vascular Closure Drvice 6F  REF# [id number] LOT# [id number] EXP. 01/31/2021     End Tidal CO2 range 20-37 during procedure.     Patient tolerated procedure, hemodynamically stable; pt drowsy but easily aroused post procedure; report given to MARSHA Alexander; patient transported to nuclear medicine for scan and to [facility] for recovery. IR RN monitored then transferred care to report RN.

## 2020-06-19 NOTE — OR SURGEON
Immediate Post- Operative Note        PostOp Diagnosis: mCRC.       Procedure(s): Pre Y90 visceral angiogram, embolization and hepatopulmonary shunt eval.      Estimated Blood Loss: Less than 5 ml        Complications: None            6/19/2020     1009 AM     Yovanny Lamb M.D.

## 2020-06-19 NOTE — DISCHARGE INSTRUCTIONS
ACTIVITY: Rest and take it easy for the first 24 hours.  A responsible adult is recommended to remain with you during that time.  It is normal to feel sleepy.  We encourage you to not do anything that requires balance, judgment or coordination.    MILD FLU-LIKE SYMPTOMS ARE NORMAL. YOU MAY EXPERIENCE GENERALIZED MUSCLE ACHES, THROAT IRRITATION, HEADACHE AND/OR SOME NAUSEA.    FOR 24 HOURS DO NOT:  Drive, operate machinery or run household appliances.  Drink beer or alcoholic beverages.   Make important decisions or sign legal documents.    SPECIAL INSTRUCTIONS: Resume home medications. Follow up with Dr. Tate. Follow up with Interventional Radiology as needed.    DIET: To avoid nausea, slowly advance diet as tolerated, avoiding spicy or greasy foods for the first day.  Add more substantial food to your diet according to your physician's instructions.  Babies can be fed formula or breast milk as soon as they are hungry.  INCREASE FLUIDS AND FIBER TO AVOID CONSTIPATION.    SURGICAL DRESSING/BATHING: Keep dressing clean and dry for 24 hours. May remove dressing and shower after 11:00 AM on 6/20, do not need to replace dressing. Do not submerge site completely in water for 7 days.    FOLLOW-UP APPOINTMENT:  A follow-up appointment should be arranged with your doctor; call to schedule.    You should CALL YOUR PHYSICIAN if you develop:  Fever greater than 101 degrees F.  Pain not relieved by medication, or persistent nausea or vomiting.  Excessive bleeding (blood soaking through dressing) or unexpected drainage from the wound.  Extreme redness or swelling around the incision site, drainage of pus or foul smelling drainage.  Inability to urinate or empty your bladder within 8 hours.  Problems with breathing or chest pain.    You should call 911 if you develop problems with breathing or chest pain.  If you are unable to contact your doctor or surgical center, you should go to the nearest emergency room or urgent care  center.  Physician's telephone #: [phone]    If any questions arise, call your doctor.  If your doctor is not available, please feel free to call the Surgical Center at [phone].  The Center is open Monday through Friday from 7AM to 7PM.  You can also call the HEALTH HOTLINE open 24 hours/day, 7 days/week and speak to a nurse at [phone], or toll free at [phone].    A registered nurse may call you a few days after your surgery to see how you are doing after your procedure.    MEDICATIONS: Resume taking daily medication.  Take prescribed pain medication with food.  If no medication is prescribed, you may take non-aspirin pain medication if needed.  PAIN MEDICATION CAN BE VERY CONSTIPATING.  Take a stool softener or laxative such as senokot, pericolace, or milk of magnesia if needed.    If your physician has prescribed pain medication that includes Acetaminophen (Tylenol), do not take additional Acetaminophen (Tylenol) while taking the prescribed medication.    Depression / Suicide Risk    As you are discharged from this [facility] facility, it is important to learn how to keep safe from harming yourself.    Recognize the warning signs:  · Abrupt changes in personality, positive or negative- including increase in energy   · Giving away possessions  · Change in eating patterns- significant weight changes-  positive or negative  · Change in sleeping patterns- unable to sleep or sleeping all the time   · Unwillingness or inability to communicate  · Depression  · Unusual sadness, discouragement and loneliness  · Talk of wanting to die  · Neglect of personal appearance   · Rebelliousness- reckless behavior  · Withdrawal from people/activities they love  · Confusion- inability to concentrate     If you or a loved one observes any of these behaviors or has concerns about self-harm, here's what you can do:  · Talk about it- your feelings and reasons for harming yourself  · Remove any means that you  "might use to hurt yourself (examples: pills, rope, extension cords, firearm)  · Get professional help from the community (Mental Health, Substance Abuse, psychological counseling)  · Do not be alone:Call your Safe Contact- someone whom you trust who will be there for you.  · Call your local CRISIS HOTLINE 002-0386 or 730-254-2914  · Call your local Children's Mobile Crisis Response Team [place] (017) 060-7006 or www[employer]era  · Call the toll free National Suicide Prevention Hotlines   · National Suicide Prevention Lifeline 604-064-GOCB (2432)  [place] [employer] 800-SUICIDE (994-3101)    Post Angiogram Groin Care Instructions     INSTRUCTIONS  Examine (look and feel) the site of your incision site TODAY so you can recognize changes that should be called to your doctor (see below).  Avoid straining either by lifting or pulling objects for 4-5 days. Avoid lifting over 10 pounds.   For at least 72 hours, if you should sneeze or cough, please hold pressure over your groin area.  If you should begin to have oozing from the catheterization site, please hold firm pressure and call your doctor's office immediately.  If profuse bleeding occurs from the catheterization site, hold firm pressure and call \"[phone]\" immediately for assistance.  Remove bandage after 24 hours.     ACTIVITY  Limit activity as instructed by your doctor.  No driving or very limited driving with frequent stops for one week.   If you must take a long car ride, stop every hour and walk around the car.   Warm showers are permitted after the bandage is removed. Avoid hot showers, baths, hot tubs, and swimming for one week.    PLEASE CALL YOUR DOCTOR IF:  Temperature elevation occurs.  Catheterization site becomes reddened or begins to drain.   Bruising appears to be new or not resolving. The bruise may move down your leg. This is normal.  The small round lump in the groin increases in size.  Any leg numbness, aching, or discomfort " (immediately).  Increasing discomfort in the leg at the insertion site.    MISCELLANEOUS INSTRUCTIONS  Bruising may occur as a result of catheterization. Some of the discoloration may travel down the leg, going from blue to green in color.  A small round lump under the catheterization site may remain for up to six weeks.  If any questions arise call your physician's office. You can also call the HEALTH HOTLINE open 24 hours/day, 7 days/week and speak to a nurse at [phone], or toll free at [phone].   You should call [phone] if you develop problems with breathing or chest pain.    FOR PROBLEMS CALL DR. KAUR AT: [phone]    I acknowledge receipt and understanding of these Home Care instructions.  ·

## 2020-06-19 NOTE — OR NURSING
1056: Patient arrived from IR s/p hepatic angiogram with RN. Left femoral access site; clean, dry, and soft. Patient is alert and awake. Updated on POC. Patient tolerating PO intake.    1110: Spouse updated on POC via telephone.    1230: Patient up to ambulate with a steady gait to the bathroom. Left femoral site remains clean, dry, and soft. Criteria met to discharge patient.    1250: Discharge paperwork reviewed with patient and spouse. Discussed activity, site care, worsening symptoms, and follow-up. Verbalized understanding. No further questions. PIV removed, tip intact.    1300: Patient escorted out in wheelchair with RN. Discharge instructions and personal belongings in possession of the patient. Copy of discharge instructions signed and placed in the chart. Patient discharged to home with spouse.

## 2020-06-22 NOTE — PROGRESS NOTES
Pt contacted APRN regarding fever. Has spoken with Yovanny Lamb MD over the weekend regarding Tmax 101.3. He advised Motrin, which worked, but now she reports temp 99.7 today. Denies cough, rash, diarrhea, pain, and dysuria. States she has always checked her temperature daily and denies any symptoms that would prompt her to feel febrile. Reports a great deal of anxiety over diagnosis and inability to proceed with Y90 due to high hepatopulmonary shunt.     Discussion with pt included post angioseal activity and site care. Unsure about etiology of fever and recommend that she contact her oncologist regarding this issue as it may affect her next planned chemotherapy. She states she has a call out to his office already. D/w Dr. Lamb, who has no additional input outside his discussion with the patient yesterday.

## 2023-05-05 NOTE — ED AVS SNAPSHOT
5/10/2017    Vivian Bajwa   [address] [address]  [facility] [address]    Dear Vivian:    [facility] wants to ensure your discharge home is safe and you or your loved ones have had all of your questions answered regarding your care after you leave the hospital.    Below is a list of resources and contact information should you have any questions regarding your hospital stay, follow-up instructions, or active medical symptoms.    Questions or Concerns Regarding… Contact   Medical Questions Related to Your Discharge  (7 days a week, 8am-5pm) Contact a Nurse Care Coordinator   [phone]   Medical Questions Not Related to Your Discharge  (24 hours a day / 7 days a week)  Contact the Nurse Health Line   [phone]    Medications or Discharge Instructions Refer to your discharge packet   or contact your [facility] Primary Care Provider   [phone]   Follow-up Appointment(s) Schedule your appointment via [employer]   or contact Scheduling [phone]   Billing Review your statement via [employer]  or contact Billing [phone]   Medical Records Review your records via [employer]   or contact Medical Records [phone]     You may receive a telephone call within two days of discharge. This call is to make certain you understand your discharge instructions and have the opportunity to have any questions answered. You can also easily access your medical information, test results and upcoming appointments via the [employer] free online health management tool. You can learn more and sign up at [employer]. For assistance setting up your [employer] account, please call [phone].    Once again, we want to ensure your discharge home is safe and that you have a clear understanding of any next steps in your care. If you have any questions or concerns, please do not hesitate to contact us, we are here for you. Thank you for choosing [facility] for your healthcare needs.    Sincerely,    Your [facility] Healthcare Team          02-Nov-2022

## (undated) DEVICE — BITE BLOCK ADULT 60FR (100EA/CA)

## (undated) DEVICE — CATHETER IV SAFETY 22 GA X 1 (50EA/BX)

## (undated) DEVICE — FORCEP RADIAL JAW 4 STANDARD CAPACITY W/NEEDLE 240CM (40EA/BX)

## (undated) DEVICE — KIT CUSTOM PROCEDURE SINGLE FOR ENDO  (15/CA)

## (undated) DEVICE — TUBE E-T HI-LO CUFF 7.5MM (10EA/PK)

## (undated) DEVICE — KIT  I.V. START (100EA/CA)

## (undated) DEVICE — TUBE E-T HI-LO CUFF 8.0MM (10EA/PK)

## (undated) DEVICE — TUBE E-T HI-LO CUFF 7.0MM (10EA/PK)

## (undated) DEVICE — SPONGE GAUZE NON-STERILE 4X4 - (2000/CA 10PK/CA)

## (undated) DEVICE — LACTATED RINGERS INJ 1000 ML - (14EA/CA 60CA/PF)

## (undated) DEVICE — SYRINGE DISP. 60 CC LL - (30/BX, 12BX/CA)**WHEN THESE ARE GONE ORDER #500206**

## (undated) DEVICE — SYRINGE 12 CC LUER TIP - (80/BX) OBSOLETE ITEM

## (undated) DEVICE — MASK ANESTHESIA ADULT  - (100/CA)

## (undated) DEVICE — SYRINGE SAFETY 3 ML 18 GA X 1 1/2 BLUNT LL (100/BX 8BX/CA)

## (undated) DEVICE — TUBE E-T HI-LO CUFF 8.5MM (10EA/PK)

## (undated) DEVICE — KIT ANESTHESIA W/CIRCUIT & 3/LT BAG W/FILTER (20EA/CA)

## (undated) DEVICE — Device

## (undated) DEVICE — WATER IRRIGATION STERILE 1000ML (12EA/CA)

## (undated) DEVICE — TUBE CONNECTING SUCTION - CLEAR PLASTIC STERILE 72 IN (50EA/CA)

## (undated) DEVICE — GLOVE, LITE (PAIR)

## (undated) DEVICE — MASK WITH FACE SHIELD (25/BX 4BX/CA)

## (undated) DEVICE — TUBE E-T HI-LO CUFF 6.5MM (10EA/BX)

## (undated) DEVICE — TUBE SUCTION YANKAUER  1/4 X 6FT (20EA/CA)"

## (undated) DEVICE — NEPTUNE 4 PORT MANIFOLD - (20/PK)

## (undated) DEVICE — CATHETER IV SAFETY 20 GA X 1-1/4 (50/BX)

## (undated) DEVICE — CANISTER SUCTION RIGID RED 1500CC (40EA/CA)

## (undated) DEVICE — SENSOR SPO2 ADULT LNCS ADTX (20/BX) ORDER ITEM #19593

## (undated) DEVICE — NEEDLE EUS DELIVERY SYSTEM FNB PRE LOADED 19 GA

## (undated) DEVICE — SYRINGE SAFETY 5 ML 18 GA X 1-1/2 BLUNT LL (100/BX 4BX/CA)

## (undated) DEVICE — GOWN SURGEONS LARGE - (32/CA)

## (undated) DEVICE — TUBING CLEARLINK DUO-VENT - C-FLO (48EA/CA)

## (undated) DEVICE — SYRINGE SAFETY 10 ML 18 GA X 1 1/2 BLUNT LL (100/BX 4BX/CA)

## (undated) DEVICE — BLOCK BITE ENDOSCOPIC 2809 - (100/BX) INTERMEDIATE

## (undated) DEVICE — ELECTRODE DUAL RETURN W/ CORD - (50/PK)